# Patient Record
Sex: FEMALE | Race: WHITE | Employment: UNEMPLOYED | URBAN - NONMETROPOLITAN AREA
[De-identification: names, ages, dates, MRNs, and addresses within clinical notes are randomized per-mention and may not be internally consistent; named-entity substitution may affect disease eponyms.]

---

## 2017-12-11 ENCOUNTER — HOSPITAL ENCOUNTER (OUTPATIENT)
Age: 62
Discharge: HOME | End: 2017-12-11
Payer: COMMERCIAL

## 2017-12-11 ENCOUNTER — HOSPITAL ENCOUNTER (OUTPATIENT)
Dept: GENERAL RADIOLOGY | Age: 62
Discharge: HOME | End: 2017-12-11
Payer: COMMERCIAL

## 2017-12-11 DIAGNOSIS — M41.80 OTHER FORM OF SCOLIOSIS, UNSPECIFIED SPINAL REGION: ICD-10-CM

## 2017-12-11 DIAGNOSIS — M48.062 LUMBAR STENOSIS WITH NEUROGENIC CLAUDICATION: ICD-10-CM

## 2017-12-11 DIAGNOSIS — M51.27 HERNIATION OF INTERVERTEBRAL DISC OF LUMBOSACRAL REGION: ICD-10-CM

## 2017-12-11 LAB
ANION GAP SERPL CALCULATED.3IONS-SCNC: 16 MEQ/L (ref 8–16)
APTT: 28.6 SECONDS (ref 22–38)
BACTERIA: ABNORMAL /HPF
BILIRUBIN URINE: NEGATIVE
BLOOD, URINE: ABNORMAL
BUN BLDV-MCNC: 18 MG/DL (ref 7–22)
CASTS UA: ABNORMAL /LPF
CHARACTER, URINE: CLEAR
CHLORIDE BLD-SCNC: 106 MEQ/L (ref 98–111)
CO2: 22 MEQ/L (ref 23–33)
COLOR: YELLOW
CREAT SERPL-MCNC: 0.7 MG/DL (ref 0.4–1.2)
CRYSTALS, UA: ABNORMAL
EKG ATRIAL RATE: 50 BPM
EKG P AXIS: 46 DEGREES
EKG P-R INTERVAL: 120 MS
EKG Q-T INTERVAL: 418 MS
EKG QRS DURATION: 94 MS
EKG QTC CALCULATION (BAZETT): 381 MS
EKG R AXIS: 53 DEGREES
EKG T AXIS: 30 DEGREES
EKG VENTRICULAR RATE: 50 BPM
EPITHELIAL CELLS, UA: ABNORMAL /HPF
GFR SERPL CREATININE-BSD FRML MDRD: 84 ML/MIN/1.73M2
GLUCOSE BLD-MCNC: 92 MG/DL (ref 70–108)
GLUCOSE URINE: NEGATIVE MG/DL
HCT VFR BLD CALC: 41 % (ref 37–47)
HEMOGLOBIN: 13.9 GM/DL (ref 12–16)
INR BLD: 0.88 (ref 0.85–1.13)
KETONES, URINE: NEGATIVE
LEUKOCYTE ESTERASE, URINE: NEGATIVE
MCH RBC QN AUTO: 31.4 PG (ref 27–31)
MCHC RBC AUTO-ENTMCNC: 34 GM/DL (ref 33–37)
MCV RBC AUTO: 92.4 FL (ref 81–99)
NITRITE, URINE: NEGATIVE
PDW BLD-RTO: 12.9 % (ref 11.5–14.5)
PH UA: 5
PLATELET # BLD: 251 THOU/MM3 (ref 130–400)
PMV BLD AUTO: 9.1 MCM (ref 7.4–10.4)
POTASSIUM SERPL-SCNC: 4.4 MEQ/L (ref 3.5–5.2)
PROTEIN UA: NEGATIVE
RBC # BLD: 4.43 MILL/MM3 (ref 4.2–5.4)
RBC URINE: ABNORMAL /HPF
SODIUM BLD-SCNC: 144 MEQ/L (ref 135–145)
SPECIFIC GRAVITY, URINE: 1.01 (ref 1–1.03)
UROBILINOGEN, URINE: 0.2 EU/DL
WBC # BLD: 4.8 THOU/MM3 (ref 4.8–10.8)
WBC UA: ABNORMAL /HPF

## 2017-12-11 PROCEDURE — 85730 THROMBOPLASTIN TIME PARTIAL: CPT

## 2017-12-11 PROCEDURE — 85027 COMPLETE CBC AUTOMATED: CPT

## 2017-12-11 PROCEDURE — 82947 ASSAY GLUCOSE BLOOD QUANT: CPT

## 2017-12-11 PROCEDURE — 71020 XR CHEST STANDARD TWO VW: CPT

## 2017-12-11 PROCEDURE — 80051 ELECTROLYTE PANEL: CPT

## 2017-12-11 PROCEDURE — 85610 PROTHROMBIN TIME: CPT

## 2017-12-11 PROCEDURE — 93005 ELECTROCARDIOGRAM TRACING: CPT

## 2017-12-11 PROCEDURE — 84520 ASSAY OF UREA NITROGEN: CPT

## 2017-12-11 PROCEDURE — 36415 COLL VENOUS BLD VENIPUNCTURE: CPT

## 2017-12-11 PROCEDURE — 82565 ASSAY OF CREATININE: CPT

## 2017-12-11 PROCEDURE — 81001 URINALYSIS AUTO W/SCOPE: CPT

## 2020-09-22 ENCOUNTER — OFFICE VISIT (OUTPATIENT)
Dept: PHYSICAL MEDICINE AND REHAB | Age: 65
End: 2020-09-22
Payer: MEDICARE

## 2020-09-22 VITALS
DIASTOLIC BLOOD PRESSURE: 76 MMHG | BODY MASS INDEX: 22.74 KG/M2 | SYSTOLIC BLOOD PRESSURE: 124 MMHG | WEIGHT: 133.2 LBS | HEIGHT: 64 IN

## 2020-09-22 PROCEDURE — 99204 OFFICE O/P NEW MOD 45 MIN: CPT | Performed by: NURSE PRACTITIONER

## 2020-09-22 RX ORDER — ACETAMINOPHEN 325 MG/1
650 TABLET ORAL EVERY 6 HOURS PRN
COMMUNITY

## 2020-09-22 RX ORDER — ACETAMINOPHEN 160 MG
TABLET,DISINTEGRATING ORAL DAILY
COMMUNITY

## 2020-09-22 RX ORDER — IBUPROFEN 200 MG
200 TABLET ORAL EVERY 6 HOURS PRN
COMMUNITY

## 2020-09-22 RX ORDER — MELOXICAM 7.5 MG/1
7.5 TABLET ORAL PRN
COMMUNITY
End: 2020-10-27 | Stop reason: SDUPTHER

## 2020-09-22 RX ORDER — GABAPENTIN 100 MG/1
100-200 CAPSULE ORAL NIGHTLY
Qty: 60 CAPSULE | Refills: 0 | Status: SHIPPED | OUTPATIENT
Start: 2020-09-22 | End: 2020-12-08 | Stop reason: SINTOL

## 2020-09-22 ASSESSMENT — ENCOUNTER SYMPTOMS
CHEST TIGHTNESS: 0
VOMITING: 0
APNEA: 0
ABDOMINAL PAIN: 0
EYE PAIN: 0
COLOR CHANGE: 0
NAUSEA: 0
BACK PAIN: 1
SORE THROAT: 0
CONSTIPATION: 0
SHORTNESS OF BREATH: 0
DIARRHEA: 0
EYE ITCHING: 0
COUGH: 0
SINUS PAIN: 0
RHINORRHEA: 0
SINUS PRESSURE: 0
EYE REDNESS: 0

## 2020-09-22 NOTE — PROGRESS NOTES
to better. \"        HPI:     ChiefComplaint: SI and Low back pain    HPI    Patient here today for new patient evaluation for low back pain. Patient with previous lumbar surgery, decompression and fusion of L5-S1 in January,2018 with Dr Jenn Arshad. Patient noted improvement with walking, low back pain, right leg pain, sleeping after surgery. Patient with strain and pulling of SI area after a fall. Patient was in [address] in April 2019. Patient noted increased pain in right leg pain, spasms in the lower leg, occasionally in the left leg, and pain in low back and buttock area after this. Patient saw Dr Jenn Arshad this summer, and her recommended conservative treatment with PT and pain management. Patient starting PT October 1 at [facility] in [place]. Patient presents for evaluation of Low back and bilateral leg pain. Symptoms have been present for several years. Patient reports no known injury. Patient describes symptoms as numbness in right leg and occasionally into left leg and pain in low back, bilateral SI, and right leg (aching, numbing, sharp, stabbing and constant in character; 6/10 in severity). Pain at best is 3 out of 10. Patient states with almost every step with her left foot she hears \"crunching\" in the low back. Symptoms are worst: activity and throughout the day. Alleviating factors identifiable by patient are recumbency, sitting and changing postions. Exacerbating factors identifiable by patient are bending forwards, standing, walking and one position for too long or over activity. Treatments so far initiated by patient include CBD cream, ice, heat, home exercises, surgery, NSAIDs and tylenol. Treatments that were helpful were short term relief with cbd cream, ice, heat, home exercises, surgery, NSAIDs and tylenol. Patient states that the lumbar pain bothers her more than the leg pain at this time. The patient is allergic to iodides.     PastMedical History  Garfield Ruiz  has a past medical history of for color change, rash and wound. Allergic/Immunologic: Positive for environmental allergies. Negative for food allergies. Neurological: Positive for numbness and headaches. Negative for dizziness, seizures and light-headedness. Hematological: Does not bruise/bleed easily. Psychiatric/Behavioral: Positive for sleep disturbance. The patient is not nervous/anxious. Objective:     Vitals:    09/22/20 0941   BP: 124/76   Weight: 133 lb 3.2 oz (60.4 kg)   Height: 5' 4\" (1.626 m)       Physical Exam  Vitals signs reviewed. Constitutional:       General: She is not in acute distress. Appearance: She is well-developed. She is not diaphoretic. HENT:      Head: Normocephalic and atraumatic. Not macrocephalic and not microcephalic. Right Ear: External ear normal.      Left Ear: External ear normal.   Eyes:      General:         Right eye: No discharge. Left eye: No discharge. Conjunctiva/sclera: Conjunctivae normal.   Neck:      Trachea: No tracheal deviation. Cardiovascular:      Rate and Rhythm: Normal rate and regular rhythm. Heart sounds: Normal heart sounds. No murmur. No gallop. Pulmonary:      Effort: Pulmonary effort is normal. No respiratory distress. Breath sounds: Normal breath sounds. No wheezing or rales. Abdominal:      General: Bowel sounds are normal. There is no distension. Palpations: Abdomen is soft. Tenderness: There is no abdominal tenderness. There is no guarding. Musculoskeletal:         General: Tenderness present. Lumbar back: She exhibits decreased range of motion, tenderness and pain. Back:         Legs:    Skin:     General: Skin is warm and dry. Coloration: Skin is not pale. Findings: No rash. Neurological:      Mental Status: She is alert and oriented to person, place, and time. Cranial Nerves: No cranial nerve deficit.    Psychiatric:         Attention and Perception: Attention and perception normal. She is attentive. Mood and Affect: Mood and affect normal.         Speech: Speech normal.         Behavior: Behavior normal.         Thought Content: Thought content normal.         Judgment: Judgment normal.       SUBHA test: + bilateral  Yeoman's test: + bilateral  Gaenslen test: + bilateral     Assessment:     1. Foraminal stenosis of lumbar region    2. Lumbar radiculopathy    3. Lumbar spondylosis    4. Facet arthropathy of spine    5. Chronic pain syndrome    6. Spasm of muscle    7. Post laminectomy syndrome    8. Fibromyalgia    9. Sacroiliac inflammation ([facility] [address]            Plan:      · Patient read and signed orientation and opioid agreement. · OARRS reviewed. Current MED: 0  · Patient was not offered naloxone for home. · Discussed long term side effects of medications, tolerance, dependency and addiction. · UDS preformed today. · Patient told can not receive any pain medications from any other source. · No evidence of abuse, diversion or aberrant behavior. · Prescription Needs: No prescription pain medications at this time   Medications and/or procedures to improve function and quality of life- patient understanding with this and that may not be pain free   Discussed possible weaning of medication dosing dependent on treatment/procedure results.  Discussed with patient about safe storage of medications at home   Testing: none   Procedures: await PT f/u - discussed facets vs TFESI vs SI MBB - depending on what is taken care of with PT.   Discussed with patient about risks with procedure including infection, reaction to medication, increased pain, or bleeding.  Medications: gabapentin 100mg at night for one week, then increase 200mg a night. Patient to call our office for update.  Will add dry needling, TENs, and US modalities to current PT order.  discussed oral CBD and risks. Patient last took 3-4 weeks ago.        Previous Treatments tried:  · PT: will start next week  · NSAIDs: Yes,  any benefit? Yes,  how long taken: months  · Chiropractic: No  · Muscle relaxants: Yes,  any benefit? No, side effects of mood swings  · Narcotics: No  · Spine surgeon consult: Yes  · Any Implants: No    Meds. Prescribed:   Orders Placed This Encounter   Medications    gabapentin (NEURONTIN) 100 MG capsule     Sig: Take 1-2 capsules by mouth nightly for 30 days. Dispense:  60 capsule     Refill:  0       Return in about 5 weeks (around 10/27/2020) for follow up after PT. Time spent with patient was 55 minutes more than 50% was spent  Counseling/coordinated the patient'scare.     Electronically signed by DOMI Blue CNP on 9/22/2020 at 10:47 AM

## 2020-09-22 NOTE — PATIENT INSTRUCTIONS
 Testing: none   Procedures: await PT f/u - discussed facets vs TFESI vs SI MBB - depending on what is taken care of with PT.   Medications: gabapentin 100mg at night for one week, then increase 200mg a night. Patient to call our office for update.  Will add dry needling, ,TENs, and US modalities to current PT order.

## 2020-10-27 ENCOUNTER — OFFICE VISIT (OUTPATIENT)
Dept: PHYSICAL MEDICINE AND REHAB | Age: 65
End: 2020-10-27
Payer: MEDICARE

## 2020-10-27 VITALS
TEMPERATURE: 96.5 F | BODY MASS INDEX: 22.71 KG/M2 | HEART RATE: 64 BPM | DIASTOLIC BLOOD PRESSURE: 74 MMHG | HEIGHT: 64 IN | SYSTOLIC BLOOD PRESSURE: 118 MMHG | WEIGHT: 133 LBS

## 2020-10-27 PROCEDURE — 4040F PNEUMOC VAC/ADMIN/RCVD: CPT | Performed by: NURSE PRACTITIONER

## 2020-10-27 PROCEDURE — 1123F ACP DISCUSS/DSCN MKR DOCD: CPT | Performed by: NURSE PRACTITIONER

## 2020-10-27 PROCEDURE — 4004F PT TOBACCO SCREEN RCVD TLK: CPT | Performed by: NURSE PRACTITIONER

## 2020-10-27 PROCEDURE — 3017F COLORECTAL CA SCREEN DOC REV: CPT | Performed by: NURSE PRACTITIONER

## 2020-10-27 PROCEDURE — 1090F PRES/ABSN URINE INCON ASSESS: CPT | Performed by: NURSE PRACTITIONER

## 2020-10-27 PROCEDURE — G8484 FLU IMMUNIZE NO ADMIN: HCPCS | Performed by: NURSE PRACTITIONER

## 2020-10-27 PROCEDURE — 99213 OFFICE O/P EST LOW 20 MIN: CPT | Performed by: NURSE PRACTITIONER

## 2020-10-27 PROCEDURE — G8400 PT W/DXA NO RESULTS DOC: HCPCS | Performed by: NURSE PRACTITIONER

## 2020-10-27 PROCEDURE — G8427 DOCREV CUR MEDS BY ELIG CLIN: HCPCS | Performed by: NURSE PRACTITIONER

## 2020-10-27 PROCEDURE — G8420 CALC BMI NORM PARAMETERS: HCPCS | Performed by: NURSE PRACTITIONER

## 2020-10-27 RX ORDER — AMITRIPTYLINE HYDROCHLORIDE 10 MG/1
5 TABLET, FILM COATED ORAL NIGHTLY
Qty: 7 TABLET | Refills: 0 | Status: SHIPPED | OUTPATIENT
Start: 2020-10-27 | End: 2020-12-08 | Stop reason: SINTOL

## 2020-10-27 RX ORDER — MELOXICAM 15 MG/1
15 TABLET ORAL DAILY
Qty: 90 TABLET | Refills: 1 | Status: SHIPPED | OUTPATIENT
Start: 2020-10-27 | End: 2021-07-15 | Stop reason: SDUPTHER

## 2020-10-27 ASSESSMENT — ENCOUNTER SYMPTOMS
BACK PAIN: 1
COLOR CHANGE: 0

## 2020-10-27 NOTE — PROGRESS NOTES
[facility]  [address]  Dept: [phone]  Dept Fax: [phone]: [phone]    Visit Date: 10/27/2020    Functionality Assessment/Goals Worksheet     On a scale of 0 (Does not Interfere) to 10 (Completely Interferes)     1. Which number describes how during the past week pain has interfered with       the following:  A. General Activity:  6  B. Mood: 6  C. Walking Ability:  6  D. Normal Work (Includes both work outside the home and housework):  6  E. Relations with Other People:   6  F. Sleep:   8  G. Enjoyment of Life:   7    2. Patient Prefers to Take their Pain Medications:     []  On a regular basis   []  Only when necessary    [x]  Does not take pain medications    3. What are the Patient's Goals/Expectations for Visiting Pain Management? []  Learn about my pain    []  Receive Medication   [x]  Physical Therapy     [x]  Treat Depression   []  Receive Injections    [x]  Treat Sleep   []  Deal with Anxiety and Stress   []  Treat Opoid Dependence/Addiction   []  Other:      HPI:   Yamila Abel is a 76 y.o. female is here today for    Chief Complaint: Low back pain    HPI     Patient here today for follow up. Patient with PT at [facility], has been completing dry needling and cupping along with therapy. Patient with 5 sessions so far. States she has noticed some improvement with depression, able to do activities longer. Still issues with sleep. Patient states only taking 100mg gabapentin at night. States that the 200mg was too much. Continues to take Mobic 7.5-15mg a day, discussed to take 15mg daily as she is tolerating them. Patient continues with issues with sleep, discussed stopping gabapentin for now and trying elavil, low dose to start. Patient understanding. Medications reviewed. The patientis allergic to iodides.     Past Medical History  Angela Oakes Objective:     Vitals:    10/27/20 1106   BP: 118/74   Site: Left Upper Arm   Position: Sitting   Cuff Size: Medium Adult   Pulse: 64   Temp: 96.5 °F (35.8 °C)   TempSrc: Temporal   Weight: 133 lb (60.3 kg)   Height: 5' 4\" (1.626 m)       Physical Exam  Vitals signs reviewed. Constitutional:       General: She is not in acute distress. Appearance: She is well-developed. She is not diaphoretic. HENT:      Head: Normocephalic and atraumatic. Not macrocephalic and not microcephalic. Right Ear: External ear normal.      Left Ear: External ear normal.   Eyes:      General:         Right eye: No discharge. Left eye: No discharge. Conjunctiva/sclera: Conjunctivae normal.   Neck:      Trachea: No tracheal deviation. Pulmonary:      Effort: Pulmonary effort is normal. No respiratory distress. Musculoskeletal:         General: Tenderness present. Lumbar back: She exhibits decreased range of motion, tenderness and pain. Back:         Legs:    Skin:     General: Skin is warm and dry. Coloration: Skin is not pale. Findings: No rash. Neurological:      Mental Status: She is alert and oriented to person, place, and time. Cranial Nerves: No cranial nerve deficit. Psychiatric:         Attention and Perception: Attention and perception normal. She is attentive. Mood and Affect: Mood and affect normal.         Speech: Speech normal.         Behavior: Behavior normal.         Thought Content: Thought content normal.         Judgment: Judgment normal.          Assessment:     1. Lumbar spondylosis    2. Facet arthropathy of spine    3. Foraminal stenosis of lumbar region    4. Lumbar radiculopathy            Plan:      · OARRS reviewed. Current MED: 0  · Patient was not offered naloxone for home. · Discussed long term side effects of medications, tolerance, dependency and addiction.   · Previous UDS reviewed  · Patient told can not receive any pain medications from any other source. · No evidence of abuse, diversion or aberrant behavior.  Medications and/or procedures to improve function and quality of life- patient understanding with this and that may not be pain free   Discussed with patient about safe storage of medications at home   Discussed possible weaning of medication dosing dependent on treatment/procedure results.  Testing: none   Procedures: none at this time   Discussed with patient about risks with procedure including infection, reaction to medication, increased pain, or bleeding.  Medications: continue Mobic 15mg daily. Stop gabapentin for now, start elavil 5mg nightly.  Continue PT.       Meds. Prescribed:   Orders Placed This Encounter   Medications    meloxicam (MOBIC) 15 MG tablet     Sig: Take 1 tablet by mouth daily     Dispense:  90 tablet     Refill:  1    amitriptyline (ELAVIL) 10 MG tablet     Sig: Take 0.5 tablets by mouth nightly for 14 days     Dispense:  7 tablet     Refill:  0       Return in about 6 weeks (around 12/8/2020).            Electronically signed by DOMI Spear CNP on10/27/2020 at 11:36 AM

## 2020-12-08 ENCOUNTER — OFFICE VISIT (OUTPATIENT)
Dept: PHYSICAL MEDICINE AND REHAB | Age: 65
End: 2020-12-08
Payer: MEDICARE

## 2020-12-08 VITALS
TEMPERATURE: 97.5 F | BODY MASS INDEX: 22.71 KG/M2 | HEIGHT: 64 IN | SYSTOLIC BLOOD PRESSURE: 128 MMHG | DIASTOLIC BLOOD PRESSURE: 68 MMHG | WEIGHT: 133 LBS

## 2020-12-08 PROCEDURE — 99214 OFFICE O/P EST MOD 30 MIN: CPT | Performed by: NURSE PRACTITIONER

## 2020-12-08 RX ORDER — LEVOTHYROXINE SODIUM 0.05 MG/1
50 TABLET ORAL DAILY
COMMUNITY

## 2020-12-08 RX ORDER — ROSUVASTATIN CALCIUM 5 MG/1
5 TABLET, COATED ORAL DAILY
COMMUNITY

## 2020-12-08 ASSESSMENT — ENCOUNTER SYMPTOMS
BACK PAIN: 1
COLOR CHANGE: 0

## 2020-12-08 NOTE — PROGRESS NOTES
Objective:     Vitals:    12/08/20 1056   BP: 128/68   Site: Left Upper Arm   Position: Sitting   Cuff Size: Small Adult   Temp: 97.5 °F (36.4 °C)   Weight: 133 lb (60.3 kg)   Height: 5' 4\" (1.626 m)       Physical Exam  Vitals signs reviewed. Constitutional:       General: She is not in acute distress. Appearance: She is well-developed. She is not diaphoretic. HENT:      Head: Normocephalic and atraumatic. Not macrocephalic and not microcephalic. Right Ear: External ear normal.      Left Ear: External ear normal.   Eyes:      General:         Right eye: No discharge. Left eye: No discharge. Conjunctiva/sclera: Conjunctivae normal.   Neck:      Trachea: No tracheal deviation. Pulmonary:      Effort: Pulmonary effort is normal. No respiratory distress. Musculoskeletal:         General: Tenderness present. Lumbar back: She exhibits decreased range of motion, tenderness and pain. Back:         Legs:    Skin:     General: Skin is warm and dry. Coloration: Skin is not pale. Findings: No rash. Neurological:      Mental Status: She is alert and oriented to person, place, and time. Cranial Nerves: No cranial nerve deficit. Psychiatric:         Attention and Perception: Attention and perception normal. She is attentive. Mood and Affect: Mood and affect normal.         Speech: Speech normal.         Behavior: Behavior normal.         Thought Content: Thought content normal.         Judgment: Judgment normal.          Assessment:     1. Lumbar spondylosis    2. Facet arthropathy of spine    3. Foraminal stenosis of lumbar region    4. Lumbar radiculopathy    5. Chronic pain syndrome    6. Spasm of muscle    7. Fibromyalgia    8. Sacroiliac inflammation ([facility] [address]    9. Post laminectomy syndrome            Plan:      · OARRS reviewed. Current MED: 0  · Patient was not offered naloxone for home.    · Discussed long term side effects of medications, tolerance,

## 2020-12-08 NOTE — PATIENT INSTRUCTIONS
 Testing: none   Procedures:  TFESI RIGHT L5 #1   Discussed with patient about risks with procedure including infection, reaction to medication, increased pain, or bleeding.    Medications: continue Mobic 15mg daily

## 2021-01-04 ENCOUNTER — ANESTHESIA EVENT (OUTPATIENT)
Dept: OPERATING ROOM | Age: 66
End: 2021-01-04
Payer: MEDICARE

## 2021-01-04 ENCOUNTER — ANESTHESIA (OUTPATIENT)
Dept: OPERATING ROOM | Age: 66
End: 2021-01-04
Payer: MEDICARE

## 2021-01-04 ENCOUNTER — APPOINTMENT (OUTPATIENT)
Dept: GENERAL RADIOLOGY | Age: 66
End: 2021-01-04
Attending: PAIN MEDICINE
Payer: MEDICARE

## 2021-01-04 ENCOUNTER — HOSPITAL ENCOUNTER (OUTPATIENT)
Age: 66
Setting detail: OUTPATIENT SURGERY
Discharge: HOME | End: 2021-01-04
Attending: PAIN MEDICINE | Admitting: PAIN MEDICINE
Payer: MEDICARE

## 2021-01-04 VITALS
OXYGEN SATURATION: 99 % | TEMPERATURE: 97.1 F | HEIGHT: 64 IN | WEIGHT: 128.8 LBS | RESPIRATION RATE: 16 BRPM | HEART RATE: 86 BPM | SYSTOLIC BLOOD PRESSURE: 147 MMHG | BODY MASS INDEX: 21.99 KG/M2 | DIASTOLIC BLOOD PRESSURE: 75 MMHG

## 2021-01-04 VITALS
SYSTOLIC BLOOD PRESSURE: 142 MMHG | RESPIRATION RATE: 12 BRPM | OXYGEN SATURATION: 100 % | DIASTOLIC BLOOD PRESSURE: 82 MMHG

## 2021-01-04 PROCEDURE — 3600000054 HC PAIN LEVEL 3 BASE: Performed by: PAIN MEDICINE

## 2021-01-04 PROCEDURE — 7100000010 HC PHASE II RECOVERY - FIRST 15 MIN: Performed by: PAIN MEDICINE

## 2021-01-04 PROCEDURE — 6360000002 HC RX W HCPCS: Performed by: PAIN MEDICINE

## 2021-01-04 PROCEDURE — 64483 NJX AA&/STRD TFRM EPI L/S 1: CPT | Performed by: PAIN MEDICINE

## 2021-01-04 PROCEDURE — 3209999900 FLUORO FOR SURGICAL PROCEDURES

## 2021-01-04 PROCEDURE — 3700000000 HC ANESTHESIA ATTENDED CARE: Performed by: PAIN MEDICINE

## 2021-01-04 PROCEDURE — 2500000003 HC RX 250 WO HCPCS: Performed by: PAIN MEDICINE

## 2021-01-04 PROCEDURE — 7100000011 HC PHASE II RECOVERY - ADDTL 15 MIN: Performed by: PAIN MEDICINE

## 2021-01-04 PROCEDURE — 2709999900 HC NON-CHARGEABLE SUPPLY: Performed by: PAIN MEDICINE

## 2021-01-04 RX ORDER — LIDOCAINE HYDROCHLORIDE 10 MG/ML
INJECTION, SOLUTION INFILTRATION; PERINEURAL PRN
Status: DISCONTINUED | OUTPATIENT
Start: 2021-01-04 | End: 2021-01-04 | Stop reason: ALTCHOICE

## 2021-01-04 RX ORDER — DEXAMETHASONE SODIUM PHOSPHATE 4 MG/ML
INJECTION, SOLUTION INTRA-ARTICULAR; INTRALESIONAL; INTRAMUSCULAR; INTRAVENOUS; SOFT TISSUE PRN
Status: DISCONTINUED | OUTPATIENT
Start: 2021-01-04 | End: 2021-01-04 | Stop reason: ALTCHOICE

## 2021-01-04 RX ORDER — BUPIVACAINE HYDROCHLORIDE 2.5 MG/ML
INJECTION, SOLUTION EPIDURAL; INFILTRATION; INTRACAUDAL PRN
Status: DISCONTINUED | OUTPATIENT
Start: 2021-01-04 | End: 2021-01-04 | Stop reason: ALTCHOICE

## 2021-01-04 ASSESSMENT — PULMONARY FUNCTION TESTS
PIF_VALUE: 0

## 2021-01-04 ASSESSMENT — PAIN - FUNCTIONAL ASSESSMENT: PAIN_FUNCTIONAL_ASSESSMENT: 0-10

## 2021-01-04 ASSESSMENT — PAIN DESCRIPTION - DESCRIPTORS: DESCRIPTORS: ACHING

## 2021-01-04 NOTE — ANESTHESIA PRE PROCEDURE
Department of Anesthesiology  Preprocedure Note       Name:  Eduardo Diego   Age:  76 y.o.  :  1955                                          MRN:  [id number]         Date:  2021      Surgeon: Jesus Saleh):  Jaquan Clark MD    Procedure: Procedure(s):  TFESI RIGHT L5 #1    Medications prior to admission:   Prior to Admission medications    Medication Sig Start Date End Date Taking? Authorizing Provider   rosuvastatin (CRESTOR) 5 MG tablet Take 5 mg by mouth daily    Historical Provider, MD   levothyroxine (SYNTHROID) 50 MCG tablet Take 50 mcg by mouth Daily    Historical Provider, MD   meloxicam (MOBIC) 15 MG tablet Take 1 tablet by mouth daily 10/27/20   Amie DOMI Hummel - CNP   Cholecalciferol (VITAMIN D3) 50 MCG (2000) CAPS Take by mouth daily    Historical Provider, MD   acetaminophen (TYLENOL) 325 MG tablet Take 650 mg by mouth every 6 hours as needed for Pain    Historical Provider, MD   ibuprofen (ADVIL) 200 MG tablet Take 200 mg by mouth every 6 hours as needed for Pain    Historical Provider, MD   rOPINIRole (REQUIP) 1 MG tablet Take 1 mg by mouth nightly    Historical Provider, MD       Current medications:    No current facility-administered medications for this encounter. Allergies:     Allergies   Allergen Reactions    Iodides Dermatitis       Problem List:    Patient Active Problem List   Diagnosis Code    Senile osteoporosis M81.0       Past Medical History:        Diagnosis Date    Fibromyalgia     Osteoporosis     Restless leg syndrome        Past Surgical History:        Procedure Laterality Date    HYSTERECTOMY      KNEE ARTHROSCOPY Bilateral        Social History:    Social History     Tobacco Use    Smoking status: Current Every Day Smoker     Packs/day: 1.00    Smokeless tobacco: Never Used   Substance Use Topics    Alcohol use: Not on file                                Ready to quit: Not Answered  Counseling given: Not Answered Vital Signs (Current): There were no vitals filed for this visit. BP Readings from Last 3 Encounters:   12/08/20 128/68   10/27/20 118/74   09/22/20 124/76       NPO Status:                                                                                 BMI:   Wt Readings from Last 3 Encounters:   12/08/20 133 lb (60.3 kg)   10/27/20 133 lb (60.3 kg)   09/22/20 133 lb 3.2 oz (60.4 kg)     There is no height or weight on file to calculate BMI.    CBC:   Lab Results   Component Value Date    WBC 4.8 12/11/2017    RBC 4.43 12/11/2017    HGB 13.9 12/11/2017    HCT 41.0 12/11/2017    MCV 92.4 12/11/2017    RDW 12.9 12/11/2017     12/11/2017       CMP:   Lab Results   Component Value Date     12/11/2017    K 4.4 12/11/2017     12/11/2017    CO2 22 12/11/2017    BUN 18 12/11/2017    CREATININE 0.7 12/11/2017    LABGLOM 84 12/11/2017    GLUCOSE 92 12/11/2017       POC Tests: No results for input(s): POCGLU, POCNA, POCK, POCCL, POCBUN, POCHEMO, POCHCT in the last 72 hours.     Coags:   Lab Results   Component Value Date    INR 0.88 12/11/2017    APTT 28.6 12/11/2017       HCG (If Applicable): No results found for: PREGTESTUR, PREGSERUM, HCG, HCGQUANT     ABGs: No results found for: PHART, PO2ART, RTY3QYM, XSE2UZE, BEART, W8ZSGACX     Type & Screen (If Applicable):  No results found for: LABABO, LABRH    Drug/Infectious Status (If Applicable):  No results found for: HIV, HEPCAB    COVID-19 Screening (If Applicable): No results found for: COVID19      Anesthesia Evaluation  Patient summary reviewed no history of anesthetic complications:   Airway: Mallampati: II  TM distance: >3 FB   Neck ROM: full  Mouth opening: > = 3 FB Dental: normal exam         Pulmonary:normal exam                               Cardiovascular:    (+) hyperlipidemia                  Neuro/Psych:               GI/Hepatic/Renal:             Endo/Other:    (+) hypothyroidism::., . Abdominal:           Vascular:                                        Anesthesia Plan      MAC     ASA 2       Induction: intravenous. Anesthetic plan and risks discussed with patient.       Plan discussed with CRNA and surgical team.                  Damaris Pisano MD   1/4/2021

## 2021-01-04 NOTE — PROGRESS NOTES
6953: Patient to phase 2 recovery room via cart. Patient is awake and alert. Report received from surgical RN, Marija Evangelista. Patient's vitals obtained, see charting. 0908: Patient is denying pain and nausea at this time. IV is infusing into her left hand. Patient is also denying any new numbness/tingling in her lower extremities. Patient's pedal push/pull is strong and equal bilaterally. 3234: Offered drink and snack provided to the patient. Bed is in the lowest position and call light is within reach. 0930: Patient is resting in bed- no needs or complaints at this time. 1004: IV removed at this time- no complications and dressing applied. Patient instructed to get dressed in bed. 1013: Patient wheeled to the car and discharged home in stable condition with her .

## 2021-01-04 NOTE — ANESTHESIA POSTPROCEDURE EVALUATION
Department of Anesthesiology  Postprocedure Note    Patient: Dirk Estrada  MRN: [id number]  YOB: 1955  Date of evaluation: 1/4/2021  Time:  9:21 AM     Procedure Summary     Date: 01/04/21 Room / Location: [address] [place] [address] Jessica Merino    Anesthesia Start: 2839 Anesthesia Stop: 9434    Procedure: TFESI RIGHT L5 #1 (Right ) Diagnosis: (Foraminal stenosis of lumbar region)    Surgeons: Solo Thomas MD Responsible Provider: Estella Jiménez MD    Anesthesia Type: MAC ASA Status: 2          Anesthesia Type: MAC    Leigh Phase I: Leigh Score: 10    Leigh Phase II: Leigh Score: 10    Last vitals: Reviewed and per EMR flowsheets. Anesthesia Post Evaluation    Patient location during evaluation: PACU  Patient participation: complete - patient participated  Level of consciousness: awake and alert  Airway patency: patent  Nausea & Vomiting: no nausea and no vomiting  Complications: no  Cardiovascular status: hemodynamically stable  Respiratory status: acceptable  Hydration status: euvolemic      [address]  POST-ANESTHESIA NOTE       Name:  Dirk Estrada                                         Age:  76 y.o.   MRN:  [id number]      Last Vitals:  BP (!) 147/75   Pulse 86   Temp 97.1 °F (36.2 °C) (Infrared)   Resp 16   Ht 5' 4\" (1.626 m)   Wt 128 lb 12.8 oz (58.4 kg)   SpO2 99%   BMI 22.11 kg/m²   Patient Vitals for the past 4 hrs:   BP Temp Temp src Pulse Resp SpO2 Height Weight   01/04/21 0906 (!) 147/75 97.1 °F (36.2 °C) Infrared 86 16 99 %     01/04/21 0841 (!) 150/87 97.8 °F (36.6 °C) Temporal 79 18 98 % 5' 4\" (1.626 m) 128 lb 12.8 oz (58.4 kg)       Level of Consciousness:  Awake    Respiratory:  Stable    Oxygen Saturation:  Stable    Cardiovascular:  Stable    Hydration:  Adequate    PONV:  Stable    Post-op Pain:  Adequate analgesia    Post-op Assessment:  No apparent anesthetic complications Additional Follow-Up / Treatment / Comment:  Luis Fallon MD  January 4, 2021   9:21 AM

## 2021-01-04 NOTE — H&P
H&P      Follow up today after PT. Was noticing improvement with PT, dry needling, and cupping. Patient stopped 2 weeks ago. Insurance issues. Patient states that also taking care of her mother who is 80 and wanted to decrease exposure. Patient states that the dry needling has been the best option for pain control for her.      Mobic increased to 15mg daily - notices a little improvement. Elavil 5mg nightly - did not tolerate, states she had mood changes severely. Gabapentin stopped.      MRI reviewed with patient. Discussed TFESI L5 Right due to ongoing right radiculopathy and right lower back pain. Patient understanding that this is recommended next step.      Medications reviewed.                    The patientis allergic to iodides.     Past Medical History  Alicia Tucker  has a past medical history of Fibromyalgia, Osteoporosis, and Restless leg syndrome.     Past Surgical History  The patient  has a past surgical history that includes Hysterectomy and Knee arthroscopy (Bilateral).     Family History  This patient's family history is not on file.     Social History  Alicia Tucker  reports that she has been smoking. She has been smoking about 1.00 pack per day.  She has never used smokeless tobacco.     Medications    Current Medication      Current Outpatient Medications:     rosuvastatin (CRESTOR) 5 MG tablet, Take 5 mg by mouth daily, Disp: , Rfl:     levothyroxine (SYNTHROID) 50 MCG tablet, Take 50 mcg by mouth Daily, Disp: , Rfl:     meloxicam (MOBIC) 15 MG tablet, Take 1 tablet by mouth daily, Disp: 90 tablet, Rfl: 1    Cholecalciferol (VITAMIN D3) 50 MCG (2000 UT) CAPS, Take by mouth daily, Disp: , Rfl:     acetaminophen (TYLENOL) 325 MG tablet, Take 650 mg by mouth every 6 hours as needed for Pain, Disp: , Rfl:     ibuprofen (ADVIL) 200 MG tablet, Take 200 mg by mouth every 6 hours as needed for Pain, Disp: , Rfl:     rOPINIRole (REQUIP) 1 MG tablet, Take 1 mg by mouth nightly, Disp: , Rfl:    Subjective:      Review of Systems   Constitutional: Positive for activity change and fatigue. Negative for chills, diaphoresis and fever. Cardiovascular: Positive for leg swelling (right leg). Negative for chest pain and palpitations. Musculoskeletal: Positive for arthralgias, back pain, myalgias and neck pain. Negative for gait problem and neck stiffness. Skin: Negative for color change, rash and wound. Allergic/Immunologic: Positive for environmental allergies. Negative for food allergies. Neurological: Positive for numbness and headaches. Negative for dizziness, seizures and light-headedness. Hematological: Does not bruise/bleed easily. Psychiatric/Behavioral: Positive for sleep disturbance. The patient is not nervous/anxious.          Objective:      Vitals       Vitals:     12/08/20 1056   BP: 128/68   Site: Left Upper Arm   Position: Sitting   Cuff Size: Small Adult   Temp: 97.5 °F (36.4 °C)   Weight: 133 lb (60.3 kg)   Height: 5' 4\" (1.626 m)            Physical Exam  Vitals signs reviewed. Constitutional:       General: She is not in acute distress. Appearance: She is well-developed. She is not diaphoretic. HENT:      Head: Normocephalic and atraumatic. Not macrocephalic and not microcephalic. Right Ear: External ear normal.      Left Ear: External ear normal.   Eyes:      General:         Right eye: No discharge. Left eye: No discharge. Conjunctiva/sclera: Conjunctivae normal.   Neck:      Trachea: No tracheal deviation. Pulmonary:      Effort: Pulmonary effort is normal. No respiratory distress. Musculoskeletal:         General: Tenderness present. Lumbar back: She exhibits decreased range of motion, tenderness and pain. Back:         Legs:    Skin:     General: Skin is warm and dry. Coloration: Skin is not pale. Findings: No rash. Neurological:      Mental Status: She is alert and oriented to person, place, and time.       Cranial Nerves: No cranial nerve deficit. Psychiatric:         Attention and Perception: Attention and perception normal. She is attentive. Mood and Affect: Mood and affect normal.         Speech: Speech normal.         Behavior: Behavior normal.         Thought Content: Thought content normal.         Judgment: Judgment normal.         Assessment:      1. Lumbar spondylosis    2. Facet arthropathy of spine    3. Foraminal stenosis of lumbar region    4. Lumbar radiculopathy    5. Chronic pain syndrome    6. Spasm of muscle    7. Fibromyalgia    8. Sacroiliac inflammation ([facility] [address]    9. Post laminectomy syndrome       Plan:      · OARRS reviewed. Current MED: 0  · Patient was not offered naloxone for home. · Discussed long term side effects of medications, tolerance, dependency and addiction. · Previous UDS reviewed  · Patient told can not receive any pain medications from any other source. · No evidence of abuse, diversion or aberrant behavior. · Medications and/or procedures to improve function and quality of life- patient understanding with this and that may not be pain free  · Discussed with patient about safe storage of medications at home  · Discussed possible weaning of medication dosing dependent on treatment/procedure results. · Testing: none  · Procedures:  TFESI RIGHT L5 #1  · Discussed with patient about risks with procedure including infection, reaction to medication, increased pain, or bleeding. · Medications: continue Mobic 15mg daily         Meds.  Prescribed:     Encounter Medications    No orders of the defined types were placed in this encounter.         Return for TFESI RIGHT L5 #1, follow up after procedure with Obdulia .

## 2021-01-04 NOTE — H&P
[address] [address]  History and Physical Update    Pt Name: Kennedy Combs  MRN: [id number]  YOB: 1955  Date of evaluation: 1/4/2021      I have examined the patient and reviewed the H&P/Consult and there are no changes to the patient or plans.         Electronically signed by Gisell Ferguson MD on 1/4/2021 at 8:38 AM

## 2021-01-04 NOTE — OP NOTE
Operative Note    Pre-Procedure Note    Patient Name: Naomi Dee   YOB: 1955  Medical Record Number: [id number]  Date: 1/4/21       Indication:   RLE PAIN    Consent: On file. Vital Signs:   Vitals:    01/04/21 0841   BP: (!) 150/87   Pulse: 79   Resp: 18   Temp: 97.8 °F (36.6 °C)   SpO2: 98%       Past Medical History:   has a past medical history of Fibromyalgia, Osteoporosis, and Restless leg syndrome. Past Surgical History:   has a past surgical history that includes Hysterectomy and Knee arthroscopy (Bilateral). Pre-Sedation Documentation and Exam:   Vital signs have been reviewed (see flow sheet for vitals). Sedation/ Anesthesia Plan:   MAC    Patient is an appropriate candidate for plan of sedation: yes    Preoperative Diagnosis:  L-FORAMINAL STENOSIS, L-RADICULOPATHY  L-POST-LAMINECTOMY SYNDROME    Post-Op Dx:  AS ABOVE    Procedure Performed:  Transforaminal lumbar epidural steroid injection at the levels of L5 on the Right side under fluoroscopic guidance . Indication for the Procedure: The patient failed conservative management  for pain in the low back radiating to lower extremities. As the patient is not responding to conservative management and pain is interfering with activities of daily living, we decided to proceed with transforaminal lumbar epidural steroid injection as symptoms are mostly following the nerve root distribution. The procedure and the risks were discussed with the patient and informed consent was obtained. Procedure:  SEVERE ALLERGY TO DYE    A meaningful communication was kept up with the patient throughout   the procedure. The patient is placed in prone position. The skin over the back was prepped and draped in sterile manner. Then the skin and deep tissues just above the tip of the transverse process of L-5 vertebra on Right side were infiltrated with about 6 ml of 1% lidocaine.  The #20-gauge, 3-1/2 inch Tuohy needle/#22-gauge, 5 inch spinal needle was inserted through the skin wheal  and under fluoroscopy guidance was directed such that the tip of the needle lies in the neuroforamina at about the 6 o'clock position under the pedicle of the L-5 vertebra. This was confirmed with AP and lateral views of the fluoroscopy. Then after negative aspiration a total of 0 ml of Omnipaque-180 was injected through the needle and spread of the contrast in the epidural space as well as along the nerve root was observed. Then after negative aspiration a total of 6 mg of Dexamethasone and 2 ml of 0.25%Marcaine MPF was injected through the needle. The needle is removed and a Band-Aid was placed over the needle  insertion site. EBL-0  The patient's vital signs remained stable and the patient tolerated the procedure well. The patient was discharged home in stable condition and will be followed in the pain clinic in the next few weeks for further planning.       Electronically signed by Leodan Barfield MD on 1/4/21 at 9:04 AM EST

## 2021-01-21 ENCOUNTER — OFFICE VISIT (OUTPATIENT)
Dept: PHYSICAL MEDICINE AND REHAB | Age: 66
End: 2021-01-21
Payer: MEDICARE

## 2021-01-21 VITALS
WEIGHT: 128 LBS | DIASTOLIC BLOOD PRESSURE: 80 MMHG | HEIGHT: 64 IN | BODY MASS INDEX: 21.85 KG/M2 | TEMPERATURE: 97.6 F | SYSTOLIC BLOOD PRESSURE: 130 MMHG | HEART RATE: 70 BPM

## 2021-01-21 DIAGNOSIS — M47.816 LUMBAR SPONDYLOSIS: Primary | ICD-10-CM

## 2021-01-21 DIAGNOSIS — M48.061 FORAMINAL STENOSIS OF LUMBAR REGION: ICD-10-CM

## 2021-01-21 DIAGNOSIS — M54.16 LUMBAR RADICULOPATHY: ICD-10-CM

## 2021-01-21 DIAGNOSIS — M79.7 FIBROMYALGIA: ICD-10-CM

## 2021-01-21 DIAGNOSIS — M46.1 SACROILIAC INFLAMMATION (HCC): ICD-10-CM

## 2021-01-21 DIAGNOSIS — G89.4 CHRONIC PAIN SYNDROME: ICD-10-CM

## 2021-01-21 DIAGNOSIS — M62.838 SPASM OF MUSCLE: ICD-10-CM

## 2021-01-21 DIAGNOSIS — M47.819 FACET ARTHROPATHY OF SPINE: ICD-10-CM

## 2021-01-21 DIAGNOSIS — M96.1 POST LAMINECTOMY SYNDROME: ICD-10-CM

## 2021-01-21 PROCEDURE — 99214 OFFICE O/P EST MOD 30 MIN: CPT | Performed by: NURSE PRACTITIONER

## 2021-01-21 RX ORDER — TRAMADOL HYDROCHLORIDE 50 MG/1
50 TABLET ORAL EVERY 6 HOURS PRN
Qty: 56 TABLET | Refills: 0 | Status: SHIPPED | OUTPATIENT
Start: 2021-01-21 | End: 2021-02-04

## 2021-01-21 ASSESSMENT — ENCOUNTER SYMPTOMS
BACK PAIN: 1
COLOR CHANGE: 0

## 2021-01-21 NOTE — PROGRESS NOTES
[address] [facility] [address]  Dept: [phone]  Dept Fax: [phone]: [phone]    Visit Date: 1/21/2021    Functionality Assessment/Goals Worksheet     On a scale of 0 (Does not Interfere) to 10 (Completely Interferes)     1. Which number describes how during the past week pain has interfered with       the following:  A. General Activity:  6  B. Mood: 6  C. Walking Ability:  5  D. Normal Work (Includes both work outside the home and housework):  5  E. Relations with Other People:   5  F. Sleep:   7  G. Enjoyment of Life:   6    2. Patient Prefers to Take their Pain Medications:     []  On a regular basis   []  Only when necessary    [x]  Does not take pain medications    3. What are the Patient's Goals/Expectations for Visiting Pain Management? []  Learn about my pain    [x]  Receive Medication   []  Physical Therapy     []  Treat Depression   []  Receive Injections    []  Treat Sleep   []  Deal with Anxiety and Stress   []  Treat Opoid Dependence/Addiction   [x]  Other:Denies taking blood thinners    HPI:   Chana Landau is a 76 y.o. female is here today for    Chief Complaint: Low back pain, Right leg pain and Hip pain    HPI   F/U TFLESI L5 right #1 1/4/2021 no relief has had increased pain and  RLE pain and her leg gives out at times. She continues to have low back SI  RLE pain. She has had L5-S1 lumbar surgery in past with Dr April Ferro in December 2018. She  had surgical re e chayo in 2020 he recommneded pain managment conservative tx and PT. She continues to take Mobic. She was in dry needling and cupping in past with some releif. She denies any ER visits since last visit. Pain scale with out pain medications or at its worst is 7/10. Pain scale with pain medications or at its best is 5/10. Lumbar MRI      The patientis allergic to iodides.     Past Medical History  King Rosaura  has a past medical history of Fibromyalgia, Osteoporosis, and Restless leg syndrome. Past Surgical History  The patient  has a past surgical history that includes Hysterectomy; Knee arthroscopy (Bilateral); and Pain management procedure (Right, 1/4/2021). Family History  This patient's family history is not on file. Social History  King Rosaura  reports that she has been smoking. She has been smoking about 1.00 pack per day. She has never used smokeless tobacco.    Medications    Current Outpatient Medications:     traMADol (ULTRAM) 50 MG tablet, Take 1 tablet by mouth every 6 hours as needed for Pain for up to 14 days. , Disp: 56 tablet, Rfl: 0    rosuvastatin (CRESTOR) 5 MG tablet, Take 5 mg by mouth daily, Disp: , Rfl:     levothyroxine (SYNTHROID) 50 MCG tablet, Take 50 mcg by mouth Daily, Disp: , Rfl:     meloxicam (MOBIC) 15 MG tablet, Take 1 tablet by mouth daily, Disp: 90 tablet, Rfl: 1    Cholecalciferol (VITAMIN D3) 50 MCG (2000 UT) CAPS, Take by mouth daily, Disp: , Rfl:     acetaminophen (TYLENOL) 325 MG tablet, Take 650 mg by mouth every 6 hours as needed for Pain, Disp: , Rfl:     ibuprofen (ADVIL) 200 MG tablet, Take 200 mg by mouth every 6 hours as needed for Pain, Disp: , Rfl:     rOPINIRole (REQUIP) 1 MG tablet, Take 1 mg by mouth nightly, Disp: , Rfl:     Subjective:      Review of Systems   Constitutional: Positive for activity change and fatigue. Negative for chills, diaphoresis and fever. Cardiovascular: Positive for leg swelling (right leg). Negative for chest pain and palpitations. Musculoskeletal: Positive for arthralgias, back pain, gait problem, myalgias and neck pain. Negative for neck stiffness. Skin: Negative for color change, rash and wound. Allergic/Immunologic: Positive for environmental allergies. Negative for food allergies. Neurological: Positive for numbness and headaches. Negative for dizziness, seizures and light-headedness. Hematological: Does not bruise/bleed easily. Psychiatric/Behavioral: Positive for sleep disturbance. The patient is not nervous/anxious. Objective:     Vitals:    01/21/21 0844   BP: 130/80   Site: Left Upper Arm   Position: Sitting   Cuff Size: Medium Adult   Pulse: 70   Temp: 97.6 °F (36.4 °C)   TempSrc: Temporal   Weight: 128 lb (58.1 kg)   Height: 5' 4\" (1.626 m)       Physical Exam  Vitals signs reviewed. Constitutional:       General: She is not in acute distress. Appearance: She is well-developed. She is not diaphoretic. HENT:      Head: Normocephalic and atraumatic. Not macrocephalic and not microcephalic. Right Ear: External ear normal.      Left Ear: External ear normal.   Eyes:      General:         Right eye: No discharge. Left eye: No discharge. Conjunctiva/sclera: Conjunctivae normal.   Neck:      Trachea: No tracheal deviation. Pulmonary:      Effort: Pulmonary effort is normal. No respiratory distress. Musculoskeletal:         General: Tenderness present. Lumbar back: She exhibits decreased range of motion, tenderness, bony tenderness and pain. Back:         Legs:    Skin:     General: Skin is warm and dry. Coloration: Skin is not pale. Findings: No rash. Neurological:      Mental Status: She is alert and oriented to person, place, and time. Cranial Nerves: No cranial nerve deficit. Psychiatric:         Attention and Perception: Attention and perception normal. She is attentive. Mood and Affect: Mood and affect normal.         Speech: Speech normal.         Behavior: Behavior normal.         Thought Content: Thought content normal.         Judgment: Judgment normal.       SUBHA test: +  Yeomans test: +  Gaenslen test: +     Assessment:     1. Lumbar spondylosis    2. Facet arthropathy of spine    3. Foraminal stenosis of lumbar region    4. Lumbar radiculopathy    5. Fibromyalgia    6. Sacroiliac inflammation ([address]    7. Chronic pain syndrome    8. Spasm of muscle    9. Post laminectomy syndrome            Plan:      · OARRS reviewed. Current MED:0  · Patient was not offered naloxone for home. · Discussed long term side effects of medications, tolerance, dependency and addiction. · Previous UDS reviewed  · UDS preformed today for compliance. · Patient told can not receive any pain medications from any other source. · No evidence of abuse, diversion or aberrant behavior.  Medications and/or procedures to improve function and quality of life- patient understanding with this and that may not be pain free   Discussed with patient about safe storage of medications at home   Discussed possible weaning of medication dosing dependent on treatment/procedure results.  Testing: reviewed Lumbar MRI   Procedures: refuses Lumbar Si Facet MBB at this time until TFLESI procedure pain wears off    Discussed with patient about risks with procedure including infection, reaction to medication, increased pain, or bleeding.  Medications:Has tried Neurontin Elavil Cymbalta unable to tolerate due to mood swings or side effects.  Will give Tramadol low dose for flare ups. Has had before and tolerated.  If patient is on  prescribed blood thinners will need Pre Op Clearance per Cardiologist or PCP to hold all blood thinners prior to procedure for the required time frame, this is to be completed by the nursing staff during the scheduling process. Meds. Prescribed:   Orders Placed This Encounter   Medications    traMADol (ULTRAM) 50 MG tablet     Sig: Take 1 tablet by mouth every 6 hours as needed for Pain for up to 14 days. Dispense:  56 tablet     Refill:  0     Reduce doses taken as pain becomes manageable       Return in about 3 months (around 4/21/2021).                Electronically signed by DOMI Donnelly CNP on1/21/2021 at 9:06 AM

## 2021-04-15 ENCOUNTER — OFFICE VISIT (OUTPATIENT)
Dept: PHYSICAL MEDICINE AND REHAB | Age: 66
End: 2021-04-15
Payer: MEDICARE

## 2021-04-15 VITALS
DIASTOLIC BLOOD PRESSURE: 70 MMHG | SYSTOLIC BLOOD PRESSURE: 138 MMHG | HEIGHT: 64 IN | WEIGHT: 128 LBS | BODY MASS INDEX: 21.85 KG/M2 | HEART RATE: 68 BPM

## 2021-04-15 DIAGNOSIS — M79.7 FIBROMYALGIA: ICD-10-CM

## 2021-04-15 DIAGNOSIS — M47.816 LUMBAR SPONDYLOSIS: Primary | ICD-10-CM

## 2021-04-15 DIAGNOSIS — G89.4 CHRONIC PAIN SYNDROME: ICD-10-CM

## 2021-04-15 DIAGNOSIS — M54.16 LUMBAR RADICULOPATHY: ICD-10-CM

## 2021-04-15 DIAGNOSIS — M46.1 SACROILIAC INFLAMMATION (HCC): ICD-10-CM

## 2021-04-15 DIAGNOSIS — M62.838 SPASM OF MUSCLE: ICD-10-CM

## 2021-04-15 DIAGNOSIS — M96.1 POST LAMINECTOMY SYNDROME: ICD-10-CM

## 2021-04-15 DIAGNOSIS — M48.061 FORAMINAL STENOSIS OF LUMBAR REGION: ICD-10-CM

## 2021-04-15 DIAGNOSIS — M47.819 FACET ARTHROPATHY OF SPINE: ICD-10-CM

## 2021-04-15 PROCEDURE — 99214 OFFICE O/P EST MOD 30 MIN: CPT | Performed by: NURSE PRACTITIONER

## 2021-04-15 ASSESSMENT — ENCOUNTER SYMPTOMS
BACK PAIN: 1
COLOR CHANGE: 0

## 2021-04-15 NOTE — PROGRESS NOTES
[address] [facility] [address]  Dept: [phone]  Dept Fax: [phone]: [phone]    Visit Date: 4/15/2021    Functionality Assessment/Goals Worksheet     On a scale of 0 (Does not Interfere) to 10 (Completely Interferes)     1. Which number describes how during the past week pain has interfered with       the following:  A. General Activity:  6  B. Mood: 7  C. Walking Ability:  6  D. Normal Work (Includes both work outside the home and housework):  6  E. Relations with Other People:   6  F. Sleep:   7  G. Enjoyment of Life:   7    2. Patient Prefers to Take their Pain Medications:     []  On a regular basis   [x]  Only when necessary    []  Does not take pain medications    3. What are the Patient's Goals/Expectations for Visiting Pain Management? [x]  Learn about my pain    []  Receive Medication   []  Physical Therapy     []  Treat Depression   []  Receive Injections    []  Treat Sleep   []  Deal with Anxiety and Stress   []  Treat Opoid Dependence/Addiction   []  Other:      HPI:   Nic Hilario is a 76 y.o. female is here today for    Chief Complaint: Low back pain  LLE weakness   HPI   F/U continues to have low back pain . She has had L5-S1 lumbar surgery in 2018 with Dr Carolin Foster, not much relief. She had surgical re eval and he recommended pain injections. SHe has had TFLESI L5 right  with minimal releif. We have discussed Lumbar Facet MBB L4-5,5-S1 bilateral or SI MBB she declines at this time due to  just having heart stents and medical bills. She continues to take Tramadol  very rarely and Mobic. She has had PT with dry needling ad cupping in past with some relief. She has been unable to tolerate Neurontin Elavil CYmbalta. Medications reviewed. Patient denies side effects with medications. Patient states she is taking medications as prescribed. Jerryes receiving pain medications from other sources. She denies any ER visits since last visit. Pain scale with out pain medications or at its worst is 6/10. Pain scale with pain medications or at its best is 6/10. Last dose of Tramadol  was  4 days ago   Drug screen reviewed from1/2021and was appropriate  Pill count was completed today and was appropriate 12  Patient does not have naloxone available at home. Patient has not required use of naloxone at home since last office visit. The patientis allergic to iodides. Subjective:      Review of Systems   Constitutional: Positive for activity change and fatigue. Negative for chills, diaphoresis and fever. Cardiovascular: Positive for leg swelling (right leg). Negative for chest pain and palpitations. Musculoskeletal: Positive for arthralgias, back pain, gait problem, myalgias and neck pain. Negative for neck stiffness. Skin: Negative for color change, rash and wound. Allergic/Immunologic: Positive for environmental allergies. Negative for food allergies. Neurological: Positive for numbness and headaches. Negative for dizziness, seizures and light-headedness. Hematological: Does not bruise/bleed easily. Psychiatric/Behavioral: Positive for sleep disturbance. The patient is not nervous/anxious. Objective:     Vitals:    04/15/21 0833   BP: 138/70   Site: Left Upper Arm   Position: Sitting   Cuff Size: Large Adult   Pulse: 68   Weight: 128 lb (58.1 kg)   Height: 5' 4\" (1.626 m)       Physical Exam  Vitals signs reviewed. Constitutional:       General: She is not in acute distress. Appearance: She is well-developed. She is not diaphoretic. HENT:      Head: Normocephalic and atraumatic. Not macrocephalic and not microcephalic. Right Ear: External ear normal.      Left Ear: External ear normal.   Eyes:      General:         Right eye: No discharge. Left eye: No discharge.       Conjunctiva/sclera: Conjunctivae normal.   Neck:      Trachea: No tracheal deviation. Pulmonary:      Effort: Pulmonary effort is normal. No respiratory distress. Musculoskeletal:         General: Tenderness present. Lumbar back: She exhibits decreased range of motion, tenderness, bony tenderness and pain. Back:         Legs:    Skin:     General: Skin is warm and dry. Coloration: Skin is not pale. Findings: No rash. Neurological:      Mental Status: She is alert and oriented to person, place, and time. Cranial Nerves: No cranial nerve deficit. Psychiatric:         Attention and Perception: Attention and perception normal. She is attentive. Mood and Affect: Mood and affect normal.         Speech: Speech normal.         Behavior: Behavior normal.         Thought Content: Thought content normal.         Judgment: Judgment normal.            Assessment:     1. Lumbar spondylosis    2. Facet arthropathy of spine    3. Foraminal stenosis of lumbar region    4. Lumbar radiculopathy    5. Fibromyalgia    6. Sacroiliac inflammation ([facility] [address]    7. Chronic pain syndrome    8. Spasm of muscle    9. Post laminectomy syndrome            Plan:      · OARRS reviewed. Current MED:10  · Patient was not offered naloxone for home. · Discussed long term side effects of medications, tolerance, dependency and addiction. · Previous UDS reviewed  · UDS preformed today for compliance. · Patient told can not receive any pain medications from any other source. · No evidence of abuse, diversion or aberrant behavior.  Medications and/or procedures to improve function and quality of life- patient understanding with this and that may not be pain free   Discussed with patient about safe storage of medications at home   Discussed possible weaning of medication dosing dependent on treatment/procedure results.     Testing: none   Procedures: none refuses   Discussed with patient about risks with procedure including infection,

## 2021-07-15 ENCOUNTER — OFFICE VISIT (OUTPATIENT)
Dept: PHYSICAL MEDICINE AND REHAB | Age: 66
End: 2021-07-15
Payer: MEDICARE

## 2021-07-15 VITALS
DIASTOLIC BLOOD PRESSURE: 78 MMHG | BODY MASS INDEX: 21.85 KG/M2 | HEART RATE: 68 BPM | WEIGHT: 128 LBS | HEIGHT: 64 IN | SYSTOLIC BLOOD PRESSURE: 124 MMHG

## 2021-07-15 DIAGNOSIS — G89.4 CHRONIC PAIN SYNDROME: ICD-10-CM

## 2021-07-15 DIAGNOSIS — M62.838 SPASM OF MUSCLE: ICD-10-CM

## 2021-07-15 DIAGNOSIS — M79.7 FIBROMYALGIA: ICD-10-CM

## 2021-07-15 DIAGNOSIS — M96.1 POST LAMINECTOMY SYNDROME: ICD-10-CM

## 2021-07-15 DIAGNOSIS — M54.16 LUMBAR RADICULOPATHY: ICD-10-CM

## 2021-07-15 DIAGNOSIS — M47.819 FACET ARTHROPATHY OF SPINE: ICD-10-CM

## 2021-07-15 DIAGNOSIS — M47.816 LUMBAR SPONDYLOSIS: Primary | ICD-10-CM

## 2021-07-15 DIAGNOSIS — F11.90 CHRONIC, CONTINUOUS USE OF OPIOIDS: ICD-10-CM

## 2021-07-15 DIAGNOSIS — M48.061 FORAMINAL STENOSIS OF LUMBAR REGION: ICD-10-CM

## 2021-07-15 DIAGNOSIS — M46.1 SACROILIAC INFLAMMATION (HCC): ICD-10-CM

## 2021-07-15 PROCEDURE — 99214 OFFICE O/P EST MOD 30 MIN: CPT | Performed by: NURSE PRACTITIONER

## 2021-07-15 RX ORDER — TRAMADOL HYDROCHLORIDE 50 MG/1
50 TABLET ORAL EVERY 8 HOURS PRN
Qty: 90 TABLET | Refills: 0 | Status: SHIPPED | OUTPATIENT
Start: 2021-07-15 | End: 2021-08-03 | Stop reason: SDUPTHER

## 2021-07-15 RX ORDER — MELOXICAM 15 MG/1
15 TABLET ORAL DAILY PRN
Qty: 90 TABLET | Refills: 1 | Status: SHIPPED | OUTPATIENT
Start: 2021-07-15 | End: 2021-11-16 | Stop reason: SDUPTHER

## 2021-07-15 ASSESSMENT — ENCOUNTER SYMPTOMS
BACK PAIN: 1
COLOR CHANGE: 0

## 2021-07-15 NOTE — PROGRESS NOTES
[address] [facility] [address]  Dept: [phone]  Dept Fax: [phone]: [phone]    Visit Date: 7/15/2021    Functionality Assessment/Goals Worksheet     On a scale of 0 (Does not Interfere) to 10 (Completely Interferes)     1. Which number describes how during the past week pain has interfered with       the following:  A. General Activity:  8  B. Mood: 8  C. Walking Ability:  8  D. Normal Work (Includes both work outside the home and housework):  8  E. Relations with Other People:   7  F. Sleep:   8  G. Enjoyment of Life:   8    2. Patient Prefers to Take their Pain Medications:     []  On a regular basis   [x]  Only when necessary    []  Does not take pain medications    3. What are the Patient's Goals/Expectations for Visiting Pain Management? [x]  Learn about my pain    []  Receive Medication   []  Physical Therapy     []  Treat Depression   []  Receive Injections    []  Treat Sleep   []  Deal with Anxiety and Stress   []  Treat Opoid Dependence/Addiction   []  Other:    HPI:   Akila Palacios is a 78 y.o. female is here today for    Chief Complaint: Low back pain, Mid back pain and Hip pain SI pain     HPI   F/U continues to have low back mid back SI and now neck pain. She gardens and  crafts which has caused increased pain. She has had L5-S1 lumbar surgery in 2018 with Dr Phan Evans, not much relief. She had surgical re eval and he recommended pain injections. SHe has had TFLESI L5 right  with minimal releif. We have discussed Lumbar Facet MBB L4-5,5-S1 bilateral or SI MBB she declines at this time due to  just having heart stents and medical bills. She continues to take Tramadol  very rarely and Mobic. She has had PT with dry needling ad cupping in past with some relief. She has been unable to tolerate Neurontin Elavil CYmbalta.     Pain increases with bending, lifting, twisting , turning torso, reaching, pushing, pulling, walking, standing, stairs and getting up and down.  ice, heat, NSAIDS, narcotics, muscle relaxer, OTC rubs creams patches, steroid burst and injections  sharp, shooting, stabbing, burning, throbbing, aching and numbness    Medications reviewed. Patient denies side effects with medications. Patient states she is taking medications as prescribed. Shedenies receiving pain medications from other sources. She denies any ER visits since last visit. Pain scale with out pain medications or at its worst is 8/10. Pain scale with pain medications or at its best is 5/10. Last dose of Tramadol  was 3-4 days ago   Drug screen reviewed from 4/2021 and was appropriate  Pill count was completed today and was appropriate  Patient does not have naloxone available at home. Patient has not required use of naloxone at home since last office visit. The patientis allergic to iodides. Subjective:      Review of Systems   Constitutional: Positive for activity change and fatigue. Negative for chills, diaphoresis and fever. Cardiovascular: Positive for leg swelling (right leg). Negative for chest pain and palpitations. Musculoskeletal: Positive for arthralgias, back pain, gait problem, myalgias and neck pain. Negative for neck stiffness. Skin: Negative for color change, rash and wound. Allergic/Immunologic: Positive for environmental allergies. Negative for food allergies. Neurological: Positive for numbness and headaches. Negative for dizziness, seizures and light-headedness. Hematological: Does not bruise/bleed easily. Psychiatric/Behavioral: Positive for sleep disturbance. The patient is not nervous/anxious. Objective:     Vitals:    07/15/21 0836   BP: 124/78   Site: Left Upper Arm   Position: Sitting   Cuff Size: Medium Adult   Pulse: 68   Weight: 128 lb (58.1 kg)   Height: 5' 4\" (1.626 m)       Physical Exam  Vitals reviewed. Constitutional:       General: She is not in acute distress. Appearance: She is well-developed. She is not diaphoretic. HENT:      Head: Normocephalic and atraumatic. Not macrocephalic and not microcephalic. Right Ear: External ear normal.      Left Ear: External ear normal.   Eyes:      General:         Right eye: No discharge. Left eye: No discharge. Conjunctiva/sclera: Conjunctivae normal.   Neck:      Trachea: No tracheal deviation. Pulmonary:      Effort: Pulmonary effort is normal. No respiratory distress. Musculoskeletal:         General: Tenderness present. Thoracic back: Spasms, tenderness and bony tenderness present. Decreased range of motion. Lumbar back: Spasms, tenderness and bony tenderness present. Decreased range of motion. Back:       Right hip: Bony tenderness present. Left hip: Bony tenderness present. Legs:    Skin:     General: Skin is warm and dry. Coloration: Skin is not pale. Findings: No rash. Neurological:      Mental Status: She is alert and oriented to person, place, and time. Cranial Nerves: No cranial nerve deficit. Psychiatric:         Attention and Perception: Attention and perception normal. She is attentive. Mood and Affect: Mood and affect normal.         Speech: Speech normal.         Behavior: Behavior normal.         Thought Content: Thought content normal.         Judgment: Judgment normal.       SUBHA test: +  Yeomans test:+  Gaenslen test: +     Assessment:     1. Lumbar spondylosis    2. Facet arthropathy of spine    3. Foraminal stenosis of lumbar region    4. Lumbar radiculopathy    5. Fibromyalgia    6. Sacroiliac inflammation ([address]    7. Chronic pain syndrome    8. Chronic, continuous use of opioids    9. Spasm of muscle    10. Post laminectomy syndrome            Plan:      · OARRS reviewed. Current MED:10   · Patient was not offered naloxone for home.    · Discussed long term side

## 2021-08-03 DIAGNOSIS — M79.7 FIBROMYALGIA: ICD-10-CM

## 2021-08-03 DIAGNOSIS — M48.061 FORAMINAL STENOSIS OF LUMBAR REGION: ICD-10-CM

## 2021-08-03 DIAGNOSIS — F11.90 CHRONIC, CONTINUOUS USE OF OPIOIDS: ICD-10-CM

## 2021-08-03 DIAGNOSIS — M46.1 SACROILIAC INFLAMMATION (HCC): ICD-10-CM

## 2021-08-03 DIAGNOSIS — M47.819 FACET ARTHROPATHY OF SPINE: ICD-10-CM

## 2021-08-03 DIAGNOSIS — M62.838 SPASM OF MUSCLE: ICD-10-CM

## 2021-08-03 DIAGNOSIS — M54.16 LUMBAR RADICULOPATHY: ICD-10-CM

## 2021-08-03 DIAGNOSIS — M96.1 POST LAMINECTOMY SYNDROME: ICD-10-CM

## 2021-08-03 DIAGNOSIS — G89.4 CHRONIC PAIN SYNDROME: ICD-10-CM

## 2021-08-03 DIAGNOSIS — M47.816 LUMBAR SPONDYLOSIS: ICD-10-CM

## 2021-08-03 RX ORDER — TRAMADOL HYDROCHLORIDE 50 MG/1
50 TABLET ORAL EVERY 8 HOURS PRN
Qty: 90 TABLET | Refills: 0 | Status: SHIPPED | OUTPATIENT
Start: 2021-08-03 | End: 2022-04-12 | Stop reason: SDUPTHER

## 2021-08-03 NOTE — TELEPHONE ENCOUNTER
OARRS reviewed. UDS: + for  Tramadol. Last seen: 7/15/2021.  Follow-up:   Future Appointments   Date Time Provider Tim Machado   10/7/2021  8:40 AM DOMI Thomson - CNP N [facility] Pain [facility] - [address] [facility]

## 2021-10-07 ENCOUNTER — OFFICE VISIT (OUTPATIENT)
Dept: PHYSICAL MEDICINE AND REHAB | Age: 66
End: 2021-10-07
Payer: MEDICARE

## 2021-10-07 VITALS
DIASTOLIC BLOOD PRESSURE: 76 MMHG | SYSTOLIC BLOOD PRESSURE: 126 MMHG | HEART RATE: 68 BPM | HEIGHT: 64 IN | BODY MASS INDEX: 22.2 KG/M2 | WEIGHT: 130 LBS

## 2021-10-07 DIAGNOSIS — M47.819 FACET ARTHROPATHY OF SPINE: ICD-10-CM

## 2021-10-07 DIAGNOSIS — M62.838 SPASM OF MUSCLE: ICD-10-CM

## 2021-10-07 DIAGNOSIS — M48.061 FORAMINAL STENOSIS OF LUMBAR REGION: ICD-10-CM

## 2021-10-07 DIAGNOSIS — G89.4 CHRONIC PAIN SYNDROME: ICD-10-CM

## 2021-10-07 DIAGNOSIS — M79.7 FIBROMYALGIA: ICD-10-CM

## 2021-10-07 DIAGNOSIS — M96.1 POST LAMINECTOMY SYNDROME: ICD-10-CM

## 2021-10-07 DIAGNOSIS — M54.16 LUMBAR RADICULOPATHY: ICD-10-CM

## 2021-10-07 DIAGNOSIS — M46.1 SACROILIAC INFLAMMATION (HCC): ICD-10-CM

## 2021-10-07 DIAGNOSIS — M47.816 LUMBAR SPONDYLOSIS: Primary | ICD-10-CM

## 2021-10-07 DIAGNOSIS — F11.90 CHRONIC, CONTINUOUS USE OF OPIOIDS: ICD-10-CM

## 2021-10-07 PROCEDURE — 99214 OFFICE O/P EST MOD 30 MIN: CPT | Performed by: NURSE PRACTITIONER

## 2021-10-07 ASSESSMENT — ENCOUNTER SYMPTOMS
COLOR CHANGE: 0
BACK PAIN: 1

## 2021-10-07 NOTE — PROGRESS NOTES
[address] [facility] [address]  Dept: [phone]  Dept Fax: [phone]: [phone]    Visit Date: 10/7/2021    Functionality Assessment/Goals Worksheet     On a scale of 0 (Does not Interfere) to 10 (Completely Interferes)     1. Which number describes how during the past week pain has interfered with       the following:  A. General Activity:  5  B. Mood: 5  C. Walking Ability:  5  D. Normal Work (Includes both work outside the home and housework):  6  E. Relations with Other People:   4  F. Sleep:   7  G. Enjoyment of Life:   5    2. Patient Prefers to Take their Pain Medications:     []  On a regular basis   [x]  Only when necessary    []  Does not take pain medications    3. What are the Patient's Goals/Expectations for Visiting Pain Management? [x]  Learn about my pain    []  Receive Medication   []  Physical Therapy     []  Treat Depression   []  Receive Injections    []  Treat Sleep   []  Deal with Anxiety and Stress   []  Treat Opoid Dependence/Addiction   []  Other:    HPI:   Ramos Garcia is a 78 y.o. female is here today for    Chief Complaint: Low back pain, Mid back pain and Hip pain    HPI   F/U no new health issues no changes  She continues to have low back mid back SI and now neck pain. She gardens and  crafts which has caused increased pain. She has had L5-S1 lumbar surgery in 2018 with Dr Jeanette Townsend, not much relief. She had surgical re eval and he recommended pain injections. SHe has had TFLESI L5 right  with minimal releif. We have discussed Lumbar Facet MBB L4-5,5-S1 bilateral or SI MBB she declines at this time due to  just having heart stents and medical bills. She continues to take Tramadol  very rarely and Mobic. She has had PT with dry needling ad cupping in past with some relief.  She has been unable to tolerate Neurontin Elavil CYmbalta  Pain increases with bending, lifting, twisting , reaching, pushing, walking, standing, stairs and getting up and down. Treatments Tried ice, heat, NSAIDS, narcotics, muscle relaxer, OTC rubs creams patches, steroid burst and injections  Pain Description sharp, shooting, stabbing, burning, aching, numbness, tingling and pins and needles    Medications reviewed. Patient denies side effects with medications. Patient states she is taking medications as prescribed. Shedenies receiving pain medications from other sources. She denies any ER visits since last visit. Pain scale with out pain medications or at its worst is 7/10. Pain scale with pain medications or at its best is 5/10. Last dose of Tramadol 2 days ago Drug screen reviewed from 7/2021 and was appropriate  Pill count was completed today and was appropriate Pt is out of meds:   Patient does not have naloxone available at home. Patient has not required use of naloxone at home since last office visit. The patientis allergic to iodides. Subjective:      Review of Systems   Constitutional: Positive for activity change and fatigue. Negative for chills, diaphoresis and fever. Cardiovascular: Positive for leg swelling (right leg). Negative for chest pain and palpitations. Musculoskeletal: Positive for arthralgias, back pain, gait problem, myalgias and neck pain. Negative for neck stiffness. Skin: Negative for color change, rash and wound. Allergic/Immunologic: Positive for environmental allergies. Negative for food allergies. Neurological: Positive for numbness and headaches. Negative for dizziness, seizures and light-headedness. Hematological: Does not bruise/bleed easily. Psychiatric/Behavioral: Positive for sleep disturbance. The patient is not nervous/anxious.         Objective:     Vitals:    10/07/21 0837   BP: 126/76   Site: Left Upper Arm   Position: Sitting   Cuff Size: Medium Adult   Pulse: 68   Weight: 130 lb (59 kg) Height: 5' 4\" (1.626 m)       Physical Exam  Vitals reviewed. Constitutional:       General: She is not in acute distress. Appearance: She is well-developed. She is not diaphoretic. HENT:      Head: Normocephalic and atraumatic. Not macrocephalic and not microcephalic. Right Ear: External ear normal.      Left Ear: External ear normal.   Eyes:      General:         Right eye: No discharge. Left eye: No discharge. Conjunctiva/sclera: Conjunctivae normal.   Neck:      Trachea: No tracheal deviation. Pulmonary:      Effort: Pulmonary effort is normal. No respiratory distress. Musculoskeletal:         General: Tenderness present. Thoracic back: Spasms, tenderness and bony tenderness present. Decreased range of motion. Lumbar back: Spasms, tenderness and bony tenderness present. Decreased range of motion. Back:       Right hip: Bony tenderness present. Left hip: Bony tenderness present. Legs:    Skin:     General: Skin is warm and dry. Coloration: Skin is not pale. Findings: No rash. Neurological:      Mental Status: She is alert and oriented to person, place, and time. Cranial Nerves: No cranial nerve deficit. Psychiatric:         Attention and Perception: Attention and perception normal. She is attentive. Mood and Affect: Mood and affect normal.         Speech: Speech normal.         Behavior: Behavior normal.         Thought Content: Thought content normal.         Judgment: Judgment normal.       SUBHA  Patricks test: +        Assessment:     1. Lumbar spondylosis    2. Facet arthropathy of spine    3. Foraminal stenosis of lumbar region    4. Lumbar radiculopathy    5. Fibromyalgia    6. Sacroiliac inflammation ([address]    7. Chronic pain syndrome    8. Chronic, continuous use of opioids    9. Spasm of muscle    10. Post laminectomy syndrome            Plan:      · OARRS reviewed.  Current MED: 15  · Patient was not offered naloxone for home. · Discussed long term side effects of medications, tolerance, dependency and addiction. · Previous UDS reviewed  · UDS preformed today for compliance. · Patient told can not receive any pain medications from any other source. · No evidence of abuse, diversion or aberrant behavior.  Medications and/or procedures to improve function and quality of life- patient understanding with this and that may not be pain free   Discussed with patient about safe storage of medications at home   Discussed possible weaning of medication dosing dependent on treatment/procedure results.  Testing:none    Procedures: none at this time   Discussed with patient about risks with procedure including infection, reaction to medication, increased pain, or bleeding.  Medications:Mobic Tramadol PRN    If patient is on blood thinners will need approval to hold:N/A      Meds. Prescribed:   No orders of the defined types were placed in this encounter. Return in about 3 months (around 1/7/2022) for Follow up pain medications.                Electronically signed by DOMI Frereira CNP on10/7/2021 at 8:51 AM

## 2021-11-16 ENCOUNTER — TELEPHONE (OUTPATIENT)
Dept: PHYSICAL MEDICINE AND REHAB | Age: 66
End: 2021-11-16

## 2021-11-16 RX ORDER — MELOXICAM 15 MG/1
15 TABLET ORAL DAILY PRN
Qty: 90 TABLET | Refills: 1 | Status: SHIPPED | OUTPATIENT
Start: 2021-11-16 | End: 2022-04-12 | Stop reason: SDUPTHER

## 2021-11-16 NOTE — TELEPHONE ENCOUNTER
Kaila Jaimes requested a refill of Mobic. Please call this into the patient's pharmacy. Statement Selected

## 2021-11-16 NOTE — TELEPHONE ENCOUNTER
OARRS reviewed. UDS:screen negative  . Last seen: 10/7/2021.  Follow-up:   Future Appointments   Date Time Provider Tim Jeannette   1/6/2022  9:00 AM DOMI Macdonald - CNP N [facility] [address] [facility]

## 2022-01-06 ENCOUNTER — OFFICE VISIT (OUTPATIENT)
Dept: PHYSICAL MEDICINE AND REHAB | Age: 67
End: 2022-01-06
Payer: MEDICARE

## 2022-01-06 VITALS
WEIGHT: 135 LBS | SYSTOLIC BLOOD PRESSURE: 118 MMHG | HEIGHT: 64 IN | HEART RATE: 62 BPM | DIASTOLIC BLOOD PRESSURE: 62 MMHG | BODY MASS INDEX: 23.05 KG/M2

## 2022-01-06 DIAGNOSIS — M79.7 FIBROMYALGIA: ICD-10-CM

## 2022-01-06 DIAGNOSIS — M54.16 LUMBAR RADICULOPATHY: ICD-10-CM

## 2022-01-06 DIAGNOSIS — G89.4 CHRONIC PAIN SYNDROME: ICD-10-CM

## 2022-01-06 DIAGNOSIS — M48.061 FORAMINAL STENOSIS OF LUMBAR REGION: ICD-10-CM

## 2022-01-06 DIAGNOSIS — M46.1 SACROILIAC INFLAMMATION (HCC): ICD-10-CM

## 2022-01-06 DIAGNOSIS — M96.1 POST LAMINECTOMY SYNDROME: ICD-10-CM

## 2022-01-06 DIAGNOSIS — M47.819 FACET ARTHROPATHY OF SPINE: ICD-10-CM

## 2022-01-06 DIAGNOSIS — M47.816 LUMBAR SPONDYLOSIS: Primary | ICD-10-CM

## 2022-01-06 PROCEDURE — 99214 OFFICE O/P EST MOD 30 MIN: CPT | Performed by: NURSE PRACTITIONER

## 2022-01-06 ASSESSMENT — ENCOUNTER SYMPTOMS
BACK PAIN: 1
COLOR CHANGE: 0

## 2022-01-06 NOTE — PROGRESS NOTES
[address] [facility] [place] [address]  Dept: [phone]  Dept Fax: [phone]: [phone]    Visit Date: 1/6/2022    Functionality Assessment/Goals Worksheet     On a scale of 0 (Does not Interfere) to 10 (Completely Interferes)     1. Which number describes how during the past week pain has interfered with       the following:  A. General Activity:  7  B. Mood: 6  C. Walking Ability:  6  D. Normal Work (Includes both work outside the home and housework):  6  E. Relations with Other People:   6  F. Sleep:   6  G. Enjoyment of Life:   6    2. Patient Prefers to Take their Pain Medications:     []  On a regular basis   [x]  Only when necessary    []  Does not take pain medications    3. What are the Patient's Goals/Expectations for Visiting Pain Management? [x]  Learn about my pain    []  Receive Medication   []  Physical Therapy     []  Treat Depression   []  Receive Injections    []  Treat Sleep   []  Deal with Anxiety and Stress   []  Treat Opoid Dependence/Addiction   []  Other:      HPI:   Dori Mejía is a 78 y.o. female is here today for    Chief Complaint: Low back pain, Mid back pain, Neck pain, Hip pain and SI pain    HPI   F/U continues to have mostly  Low back mid back SI pain. She  had increased pain over holidays sanding furniture. She gardens and  crafts which has caused increased pain. She has had L5-S1 lumbar surgery in 2018 with Dr Louis Irene, not much relief. She had surgical re eval and he recommended pain injections. SHe has had TFLESI L5 right  with minimal releif. We have discussed Lumbar Facet MBB L4-5,5-S1 bilateral or SI MBB she declines at this time due to  just having heart stents and medical bills. She continues to take Tramadol  very rarely and Mobic. She has had PT with dry needling ad cupping in past with some relief.  She has been unable to tolerate Neurontin Elavil Cymbalta  Pain increases with bending, lifting, twisting , reaching, pushing, walking, standing, stairs and getting up and down. Treatments Tried ice, heat, NSAIDS, narcotics, muscle relaxer, OTC rubs creams patches, steroid burst and injections  Pain Description sharp, shooting, stabbing, burning, aching, numbness, tingling and pins and needles       Pain increases with bending, lifting, twisting , reaching, pushing, pulling, walking, standing, stairs and getting up and down. Treatments Tried PT/HEP, ice, heat, NSAIDS, narcotics, muscle relaxer, OTC rubs creams patches, dry needling cupping acupuncture , steroid burst and injections  Pain Description sharp, shooting, stabbing, burning and aching    Medications reviewed. Patient denies side effects with medications. Patient states she is taking medications as prescribed. Shedenies receiving pain medications from other sources. She denies any ER visits since last visit. Pain scale with out pain medications or at its worst is 7/10. Pain scale with pain medications or at its best is 4/10. Last dose of Tramadol  was 5 days ago  Drug screen reviewed from10/2021and was appropriate  Pill count completed  today and WNL: Yes      The patientis allergic to iodides. Subjective:      Review of Systems   Constitutional: Positive for activity change and fatigue. Negative for chills, diaphoresis and fever. Cardiovascular: Positive for leg swelling (right leg). Negative for chest pain and palpitations. Musculoskeletal: Positive for arthralgias, back pain, gait problem, myalgias and neck pain. Negative for neck stiffness. Skin: Negative for color change, rash and wound. Allergic/Immunologic: Positive for environmental allergies. Negative for food allergies. Neurological: Positive for numbness and headaches. Negative for dizziness, seizures and light-headedness. Hematological: Does not bruise/bleed easily. Psychiatric/Behavioral: Positive for sleep disturbance. The patient is not nervous/anxious. Objective:     Vitals:    01/06/22 0855   BP: 118/62   Site: Left Upper Arm   Position: Sitting   Cuff Size: Medium Adult   Pulse: 62   Weight: 135 lb (61.2 kg)   Height: 5' 4\" (1.626 m)       Physical Exam  Vitals reviewed. Constitutional:       General: She is not in acute distress. Appearance: She is well-developed. She is not diaphoretic. HENT:      Head: Normocephalic and atraumatic. Not macrocephalic and not microcephalic. Right Ear: External ear normal.      Left Ear: External ear normal.   Eyes:      General:         Right eye: No discharge. Left eye: No discharge. Conjunctiva/sclera: Conjunctivae normal.   Neck:      Trachea: No tracheal deviation. Pulmonary:      Effort: Pulmonary effort is normal. No respiratory distress. Musculoskeletal:         General: Tenderness present. Thoracic back: Spasms, tenderness and bony tenderness present. Decreased range of motion. Lumbar back: Spasms, tenderness and bony tenderness present. Decreased range of motion. Back:       Right hip: Bony tenderness present. Left hip: Bony tenderness present. Legs:    Skin:     General: Skin is warm and dry. Coloration: Skin is not pale. Findings: No rash. Neurological:      Mental Status: She is alert and oriented to person, place, and time. Cranial Nerves: No cranial nerve deficit. Psychiatric:         Attention and Perception: Attention and perception normal. She is attentive. Mood and Affect: Mood and affect normal.         Speech: Speech normal.         Behavior: Behavior normal.         Thought Content: Thought content normal.         Judgment: Judgment normal.       SUBHA  Patricks test  positive  Yeoman's  positive  Gaenslen's  positive  Kemps  positive  Phalens Reverse Phalens  na  Tinels  na  Spurlings  na       Assessment:     1.  Lumbar spondylosis    2. Facet arthropathy of spine    3. Foraminal stenosis of lumbar region    4. Lumbar radiculopathy    5. Fibromyalgia    6. Sacroiliac inflammation ([address]    7. Chronic pain syndrome    8. Post laminectomy syndrome            Plan:      · OARRS reviewed. Current MED: 15  · Patient was not offered naloxone for home. · Discussed long term side effects of medications, tolerance, dependency and addiction. · Previous UDS reviewed  · UDS preformed today for compliance. · Patient told can not receive any pain medications from any other source. · No evidence of abuse, diversion or aberrant behavior.  Medications and/or procedures to improve function and quality of life- patient understanding with this and that may not be pain free   Discussed with patient about safe storage of medications at home   Discussed possible weaning of medication dosing dependent on treatment/procedure results.  Testing: none   Procedures: may repeat  If need   Discussed with patient about risks with procedure including infection, reaction to medication, increased pain, or bleeding.  Medications:Mobic Tramadol   If patient is on blood thinners will need approval to hold yes or no:N/A   Does patient have implanted device AICD or pacemaker etc? Will representative need notified prior to procedure yes or no:N/A      Meds. Prescribed:   No orders of the defined types were placed in this encounter. Return in about 3 months (around 4/6/2022).                Electronically signed by DOMI Vegas CNP on1/6/2022 at 9:07 AM

## 2022-04-12 ENCOUNTER — OFFICE VISIT (OUTPATIENT)
Dept: PHYSICAL MEDICINE AND REHAB | Age: 67
End: 2022-04-12
Payer: MEDICARE

## 2022-04-12 VITALS
SYSTOLIC BLOOD PRESSURE: 126 MMHG | DIASTOLIC BLOOD PRESSURE: 76 MMHG | WEIGHT: 136 LBS | HEIGHT: 64 IN | BODY MASS INDEX: 23.22 KG/M2 | HEART RATE: 66 BPM

## 2022-04-12 DIAGNOSIS — M48.061 FORAMINAL STENOSIS OF LUMBAR REGION: ICD-10-CM

## 2022-04-12 DIAGNOSIS — G89.4 CHRONIC PAIN SYNDROME: ICD-10-CM

## 2022-04-12 DIAGNOSIS — M96.1 POST LAMINECTOMY SYNDROME: ICD-10-CM

## 2022-04-12 DIAGNOSIS — M47.816 LUMBAR SPONDYLOSIS: Primary | ICD-10-CM

## 2022-04-12 DIAGNOSIS — M62.838 SPASM OF MUSCLE: ICD-10-CM

## 2022-04-12 DIAGNOSIS — F11.90 CHRONIC, CONTINUOUS USE OF OPIOIDS: ICD-10-CM

## 2022-04-12 DIAGNOSIS — M54.16 LUMBAR RADICULOPATHY: ICD-10-CM

## 2022-04-12 DIAGNOSIS — M46.1 SACROILIAC INFLAMMATION (HCC): ICD-10-CM

## 2022-04-12 DIAGNOSIS — M47.819 FACET ARTHROPATHY OF SPINE: ICD-10-CM

## 2022-04-12 DIAGNOSIS — M79.7 FIBROMYALGIA: ICD-10-CM

## 2022-04-12 PROCEDURE — 99214 OFFICE O/P EST MOD 30 MIN: CPT | Performed by: NURSE PRACTITIONER

## 2022-04-12 RX ORDER — MELOXICAM 15 MG/1
15 TABLET ORAL DAILY PRN
Qty: 90 TABLET | Refills: 1 | Status: SHIPPED | OUTPATIENT
Start: 2022-04-12 | End: 2022-09-07 | Stop reason: SDUPTHER

## 2022-04-12 RX ORDER — TRAMADOL HYDROCHLORIDE 50 MG/1
50 TABLET ORAL EVERY 8 HOURS PRN
Qty: 90 TABLET | Refills: 0 | Status: SHIPPED | OUTPATIENT
Start: 2022-04-12 | End: 2022-09-07 | Stop reason: SDUPTHER

## 2022-04-12 ASSESSMENT — ENCOUNTER SYMPTOMS
BACK PAIN: 1
COLOR CHANGE: 0

## 2022-04-12 NOTE — PROGRESS NOTES
[facility] [address]  Dept: [phone]  Dept Fax: [phone]: [phone]    Visit Date: 4/12/2022    Functionality Assessment/Goals Worksheet     On a scale of 0 (Does not Interfere) to 10 (Completely Interferes)     1. Which number describes how during the past week pain has interfered with       the following:  A. General Activity:  7  B. Mood: 0  C. Walking Ability:  0  D. Normal Work (Includes both work outside the home and housework):  0  E. Relations with Other People:   0  F. Sleep:   0  G. Enjoyment of Life:   0    2. Patient Prefers to Take their Pain Medications:     []  On a regular basis   [x]  Only when necessary    []  Does not take pain medications    3. What are the Patient's Goals/Expectations for Visiting Pain Management? [x]  Learn about my pain    []  Receive Medication   []  Physical Therapy     []  Treat Depression   []  Receive Injections    []  Treat Sleep   []  Deal with Anxiety and Stress   []  Treat Opoid Dependence/Addiction   []  Other:      HPI:   Belkis Yoo is a 78 y.o. female is here today for    Chief Complaint: Low back pain, Mid back pain, Neck pain, Hip pain and SI pain Fibromyalgia     HPI   F/U  States having thyroid issues and hand tremeors PCP made medication changes    She continues to have mostly  Low back mid back SI pain. She  had increased pain over holidays sanding furniture.     She gardens and  crafts which has caused increased pain. She has had L5-S1 lumbar surgery in 2018 with Dr Danis Mccarthy, not much relief. She had surgical re eval and he recommended pain injections.  SHe has had TFLESI L5 right  with minimal releif.  We have discussed Lumbar Facet MBB L4-5,5-S1 bilateral or SI MBB she declines at this time due to  just having heart stents and medical bills.  She continues to take Tramadol  very rarely and Mobic. She has had PT with dry needling ad cupping in past with some relief. She has been unable to tolerate Neurontin Elavil Cymbalta  Pain increases with bending, lifting, twisting , reaching, pushing, walking, standing, stairs and getting up and down. Treatments Tried ice, heat, NSAIDS, narcotics, muscle relaxer, OTC rubs creams patches, steroid burst and injections  Pain Description sharp, shooting, stabbing, burning, aching, numbness, tingling and pins and needles        Pain increases with bending, lifting, twisting , reaching, pushing, pulling, walking, standing, stairs and getting up and down. Treatments Tried PT/HEP, ice, heat, NSAIDS, narcotics, muscle relaxer, OTC rubs creams patches, dry needling cupping acupuncture , steroid burst and injections  Pain Description sharp, shooting, stabbing, burning and aching      . She denies any ER visits or new health issues since last visit. Pain scale with out pain medications or at its worst is 8/10. Pain scale with pain medications or at its best is 5/10. Last dose of Tramadol  was yesterday  Drug screen reviewed from 1/2022 and was appropriate  Pill count completed  today and WNL: Yes   The patientis allergic to iodides. Subjective:      Review of Systems   Constitutional: Positive for activity change and fatigue. Negative for chills, diaphoresis and fever. Cardiovascular: Positive for leg swelling (right leg). Negative for chest pain and palpitations. Musculoskeletal: Positive for arthralgias, back pain, gait problem, myalgias and neck pain. Negative for neck stiffness. Skin: Negative for color change, rash and wound. Allergic/Immunologic: Positive for environmental allergies. Negative for food allergies. Neurological: Positive for numbness and headaches. Negative for dizziness, seizures and light-headedness. Hematological: Does not bruise/bleed easily. Psychiatric/Behavioral: Positive for sleep disturbance.  The patient is not nervous/anxious. Objective:     Vitals:    04/12/22 0916   BP: 126/76   Site: Left Upper Arm   Position: Sitting   Cuff Size: Medium Adult   Pulse: 66   Weight: 136 lb (61.7 kg)   Height: 5' 4\" (1.626 m)       Physical Exam  Vitals reviewed. Constitutional:       General: She is not in acute distress. Appearance: She is well-developed. She is not diaphoretic. HENT:      Head: Normocephalic and atraumatic. Not macrocephalic and not microcephalic. Right Ear: External ear normal.      Left Ear: External ear normal.   Eyes:      General:         Right eye: No discharge. Left eye: No discharge. Conjunctiva/sclera: Conjunctivae normal.   Neck:      Trachea: No tracheal deviation. Pulmonary:      Effort: Pulmonary effort is normal. No respiratory distress. Musculoskeletal:         General: Tenderness present. Thoracic back: Spasms, tenderness and bony tenderness present. Decreased range of motion. Lumbar back: Spasms, tenderness and bony tenderness present. Decreased range of motion. Back:       Right hip: Bony tenderness present. Left hip: Bony tenderness present. Legs:    Skin:     General: Skin is warm and dry. Coloration: Skin is not pale. Findings: No rash. Neurological:      Mental Status: She is alert and oriented to person, place, and time. Cranial Nerves: No cranial nerve deficit. Psychiatric:         Attention and Perception: Attention and perception normal. She is attentive. Mood and Affect: Mood and affect normal.         Speech: Speech normal.         Behavior: Behavior normal.         Thought Content: Thought content normal.         Judgment: Judgment normal.       SUBHA  Patricks test  positive  Yeoman's  or Gaenslen's positive  Kemps  positive  Spurlings  na  Orozco's na         Assessment:     1. Lumbar spondylosis    2. Facet arthropathy of spine    3. Foraminal stenosis of lumbar region    4.  Lumbar radiculopathy    5. Fibromyalgia    6. Sacroiliac inflammation ([facility] [address]    7. Chronic pain syndrome    8. Post laminectomy syndrome    9. Chronic, continuous use of opioids    10. Spasm of muscle            Plan:      · OARRS reviewed. Current MED: 15  · Patient was not offered naloxone for home.  Testing Labs or Radiology reviewed:    Testing Labs or Radiology ordered:   Procedures:discussed facet series   Discussed with patient about risks with procedure including infection, reaction to medication, increased pain, or bleeding.  Medications:Mobic Tramadol         Meds. Prescribed:   Orders Placed This Encounter   Medications    traMADol (ULTRAM) 50 MG tablet     Sig: Take 1 tablet by mouth every 8 hours as needed for Pain for up to 30 days. Dispense:  90 tablet     Refill:  0     Reduce doses taken as pain becomes manageable    meloxicam (MOBIC) 15 MG tablet     Sig: Take 1 tablet by mouth daily as needed for Pain     Dispense:  90 tablet     Refill:  1       Return in about 3 months (around 7/12/2022) for Follow up pain medications.        Electronically signed by DOMI De La Torre CNP on4/12/2022 at 9:29 AM

## 2022-09-07 ENCOUNTER — OFFICE VISIT (OUTPATIENT)
Dept: PHYSICAL MEDICINE AND REHAB | Age: 67
End: 2022-09-07
Payer: MEDICARE

## 2022-09-07 VITALS
HEART RATE: 68 BPM | WEIGHT: 135 LBS | SYSTOLIC BLOOD PRESSURE: 114 MMHG | DIASTOLIC BLOOD PRESSURE: 70 MMHG | HEIGHT: 64 IN | BODY MASS INDEX: 23.05 KG/M2

## 2022-09-07 DIAGNOSIS — M48.061 FORAMINAL STENOSIS OF LUMBAR REGION: ICD-10-CM

## 2022-09-07 DIAGNOSIS — M96.1 POST LAMINECTOMY SYNDROME: ICD-10-CM

## 2022-09-07 DIAGNOSIS — M47.816 LUMBAR SPONDYLOSIS: ICD-10-CM

## 2022-09-07 DIAGNOSIS — M47.819 FACET ARTHROPATHY OF SPINE: ICD-10-CM

## 2022-09-07 DIAGNOSIS — M79.7 FIBROMYALGIA: ICD-10-CM

## 2022-09-07 DIAGNOSIS — M46.1 SACROILIAC INFLAMMATION (HCC): ICD-10-CM

## 2022-09-07 DIAGNOSIS — G89.4 CHRONIC PAIN SYNDROME: ICD-10-CM

## 2022-09-07 DIAGNOSIS — F11.90 CHRONIC, CONTINUOUS USE OF OPIOIDS: ICD-10-CM

## 2022-09-07 DIAGNOSIS — M54.16 LUMBAR RADICULOPATHY: ICD-10-CM

## 2022-09-07 DIAGNOSIS — M62.838 SPASM OF MUSCLE: ICD-10-CM

## 2022-09-07 PROCEDURE — 1123F ACP DISCUSS/DSCN MKR DOCD: CPT | Performed by: NURSE PRACTITIONER

## 2022-09-07 PROCEDURE — 99214 OFFICE O/P EST MOD 30 MIN: CPT | Performed by: NURSE PRACTITIONER

## 2022-09-07 RX ORDER — MELOXICAM 15 MG/1
15 TABLET ORAL DAILY PRN
Qty: 90 TABLET | Refills: 1 | Status: SHIPPED | OUTPATIENT
Start: 2022-09-07 | End: 2022-12-06

## 2022-09-07 RX ORDER — TRAMADOL HYDROCHLORIDE 50 MG/1
50 TABLET ORAL EVERY 8 HOURS PRN
Qty: 90 TABLET | Refills: 0 | Status: SHIPPED | OUTPATIENT
Start: 2022-09-07 | End: 2022-10-07

## 2022-09-07 ASSESSMENT — ENCOUNTER SYMPTOMS
COLOR CHANGE: 0
BACK PAIN: 1

## 2022-09-07 NOTE — PROGRESS NOTES
[address] [facility] [address]  Dept: [phone]  Dept Fax: [phone]: [phone]    Visit Date: 9/7/2022    Functionality Assessment/Goals Worksheet     On a scale of 0 (Does not Interfere) to 10 (Completely Interferes)     1. Which number describes how during the past week pain has interfered with       the following:  A. General Activity:  7  B. Mood: 7  C. Walking Ability:  5  D. Normal Work (Includes both work outside the home and housework):  5  E. Relations with Other People:   5  F. Sleep:   8  G. Enjoyment of Life:   7    2. Patient Prefers to Take their Pain Medications:     []  On a regular basis   [x]  Only when necessary    []  Does not take pain medications    3. What are the Patient's Goals/Expectations for Visiting Pain Management? [x]  Learn about my pain    [x]  Receive Medication   []  Physical Therapy     []  Treat Depression   []  Receive Injections    []  Treat Sleep   []  Deal with Anxiety and Stress   []  Treat Opoid Dependence/Addiction   []  Other:    HPI:   Janki Ladd is a 75 y.o. female is here today for    Chief Complaint: Low back pain, Mid back pain, Neck pain, Hip pain, and SI pain Fibromyalgia     F/U no new changes. She continues to have mostly  Low back mid back SI pain. She  had increased pain over holidays sanding furniture. She gardens and  crafts which has caused increased pain. She has had L5-S1 lumbar surgery in 2018 with Dr Thuy Tracey, not much relief. She had surgical re eval and he recommended pain injections. SHe has had TFLESI L5 right  with minimal releif. We have discussed Lumbar Facet MBB L4-5,5-S1 bilateral or SI MBB she declines at this time due to  just having heart stents and medical bills. She continues to take Tramadol  very rarely and Mobic.  She has had PT with dry needling ad cupping in past with some relief. She has been unable to tolerate Neurontin Elavil Cymbalta  Pain increases with bending, lifting, twisting , reaching, pushing, walking, standing, stairs and getting up and down. Treatments Tried ice, heat, NSAIDS, narcotics, muscle relaxer, OTC rubs creams patches, steroid burst and injections  Pain Description sharp, shooting, stabbing, burning, aching, numbness, tingling and pins and needles        Pain increases with bending, lifting, twisting , reaching, pushing, pulling, walking, standing, stairs and getting up and down. Treatments Tried PT/HEP, ice, heat, NSAIDS, narcotics, muscle relaxer, OTC rubs creams patches, dry needling cupping acupuncture , steroid burst and injections  Pain Description sharp, shooting, stabbing, burning and aching      She denies any ER visits or new health issues since last visit. Pain scale with out pain medications or at its worst is 7/10. Pain scale with pain medications or at its best is 4/10. Last dose of Tramadol  was  2 days ago   Drug screen reviewed from 4/2022 and was appropriate  Pill count completed  today and WNL: Yes       The patientis allergic to iodides. Subjective:      Review of Systems   Constitutional:  Positive for activity change and fatigue. Negative for chills, diaphoresis and fever. Cardiovascular:  Positive for leg swelling (right leg). Negative for chest pain and palpitations. Musculoskeletal:  Positive for arthralgias, back pain, gait problem, myalgias and neck pain. Negative for neck stiffness. Skin:  Negative for color change, rash and wound. Allergic/Immunologic: Positive for environmental allergies. Negative for food allergies. Neurological:  Positive for numbness and headaches. Negative for dizziness, seizures and light-headedness. Hematological:  Does not bruise/bleed easily. Psychiatric/Behavioral:  Positive for sleep disturbance. The patient is not nervous/anxious.       Objective:     Vitals:    09/07/22 1035   BP: 114/70   Site: Left Upper Arm   Position: Sitting   Cuff Size: Medium Adult   Pulse: 68   Weight: 135 lb (61.2 kg)   Height: 5' 4\" (1.626 m)       Physical Exam  Vitals reviewed. Constitutional:       General: She is not in acute distress. Appearance: She is well-developed. She is not diaphoretic. HENT:      Head: Normocephalic and atraumatic. Not macrocephalic and not microcephalic. Right Ear: External ear normal.      Left Ear: External ear normal.   Eyes:      General:         Right eye: No discharge. Left eye: No discharge. Conjunctiva/sclera: Conjunctivae normal.   Neck:      Trachea: No tracheal deviation. Pulmonary:      Effort: Pulmonary effort is normal. No respiratory distress. Musculoskeletal:         General: Tenderness present. Thoracic back: Spasms, tenderness and bony tenderness present. Decreased range of motion. Lumbar back: Spasms, tenderness and bony tenderness present. Decreased range of motion. Back:       Right hip: Bony tenderness present. Left hip: Bony tenderness present. Legs:    Skin:     General: Skin is warm and dry. Coloration: Skin is not pale. Findings: No rash. Neurological:      Mental Status: She is alert and oriented to person, place, and time. Cranial Nerves: No cranial nerve deficit. Psychiatric:         Attention and Perception: Attention and perception normal. She is attentive. Mood and Affect: Mood and affect normal.         Speech: Speech normal.         Behavior: Behavior normal.         Thought Content: Thought content normal.         Judgment: Judgment normal.     SUBHA  Patricks test  positive  Yeoman's  or Gaenslen's positive  Kemps  positive  Spurlings  positive  Orozco's na         Assessment:     1. Lumbar spondylosis    2. Facet arthropathy of spine    3. Foraminal stenosis of lumbar region    4. Lumbar radiculopathy    5. Fibromyalgia    6.  [facility]ili[facility] inflammation ([facility]    7. Chronic pain syndrome    8. Post laminectomy syndrome    9. Chronic, continuous use of opioids    10. Spasm of muscle            Plan:      OARRS reviewed. Current MED: 15  Patient was not offered naloxone for home. Procedures:discussed  many  unable to afford    Medications:Mobic Tramadol       Meds. Prescribed:   Orders Placed This Encounter   Medications    traMADol (ULTRAM) 50 MG tablet     Sig: Take 1 tablet by mouth every 8 hours as needed for Pain for up to 30 days. Dispense:  90 tablet     Refill:  0     Reduce doses taken as pain becomes manageable    meloxicam (MOBIC) 15 MG tablet     Sig: Take 1 tablet by mouth daily as needed for Pain     Dispense:  90 tablet     Refill:  1       Return in about 3 months (around 12/7/2022).                Electronically signed by DOMI Valle CNP on9/7/2022 at 10:49 AM

## 2022-12-07 ENCOUNTER — OFFICE VISIT (OUTPATIENT)
Dept: PHYSICAL MEDICINE AND REHAB | Age: 67
End: 2022-12-07
Payer: MEDICARE

## 2022-12-07 VITALS
DIASTOLIC BLOOD PRESSURE: 68 MMHG | HEIGHT: 64 IN | SYSTOLIC BLOOD PRESSURE: 112 MMHG | WEIGHT: 136 LBS | BODY MASS INDEX: 23.22 KG/M2 | HEART RATE: 64 BPM

## 2022-12-07 DIAGNOSIS — M62.838 SPASM OF MUSCLE: ICD-10-CM

## 2022-12-07 DIAGNOSIS — M79.7 FIBROMYALGIA: ICD-10-CM

## 2022-12-07 DIAGNOSIS — M54.16 LUMBAR RADICULOPATHY: ICD-10-CM

## 2022-12-07 DIAGNOSIS — M47.816 LUMBAR SPONDYLOSIS: Primary | ICD-10-CM

## 2022-12-07 DIAGNOSIS — M48.061 FORAMINAL STENOSIS OF LUMBAR REGION: ICD-10-CM

## 2022-12-07 DIAGNOSIS — M96.1 POST LAMINECTOMY SYNDROME: ICD-10-CM

## 2022-12-07 DIAGNOSIS — M46.1 SACROILIAC INFLAMMATION (HCC): ICD-10-CM

## 2022-12-07 DIAGNOSIS — F11.90 CHRONIC, CONTINUOUS USE OF OPIOIDS: ICD-10-CM

## 2022-12-07 DIAGNOSIS — G89.4 CHRONIC PAIN SYNDROME: ICD-10-CM

## 2022-12-07 DIAGNOSIS — M47.819 FACET ARTHROPATHY OF SPINE: ICD-10-CM

## 2022-12-07 PROCEDURE — 99214 OFFICE O/P EST MOD 30 MIN: CPT | Performed by: NURSE PRACTITIONER

## 2022-12-07 PROCEDURE — 1123F ACP DISCUSS/DSCN MKR DOCD: CPT | Performed by: NURSE PRACTITIONER

## 2022-12-07 RX ORDER — TRAMADOL HYDROCHLORIDE 50 MG/1
50 TABLET ORAL EVERY 8 HOURS PRN
Qty: 90 TABLET | Refills: 0 | Status: SHIPPED | OUTPATIENT
Start: 2022-12-07 | End: 2023-01-06

## 2022-12-07 RX ORDER — MELOXICAM 15 MG/1
15 TABLET ORAL DAILY PRN
Qty: 90 TABLET | Refills: 1 | Status: SHIPPED | OUTPATIENT
Start: 2022-12-07 | End: 2023-03-07

## 2022-12-07 ASSESSMENT — ENCOUNTER SYMPTOMS
BACK PAIN: 1
COLOR CHANGE: 0

## 2022-12-07 NOTE — PROGRESS NOTES
[facility] [address]  Dept: [phone]  Dept Fax: [phone]: [phone]    Visit Date: 12/7/2022    Functionality Assessment/Goals Worksheet     On a scale of 0 (Does not Interfere) to 10 (Completely Interferes)     1. Which number describes how during the past week pain has interfered with       the following:  A. General Activity:  8  B. Mood: 8  C. Walking Ability:  6  D. Normal Work (Includes both work outside the home and housework):  8  E. Relations with Other People:   8  F. Sleep:   9  G. Enjoyment of Life:   9    2. Patient Prefers to Take their Pain Medications:     []  On a regular basis   [x]  Only when necessary    []  Does not take pain medications    3. What are the Patient's Goals/Expectations for Visiting Pain Management? [x]  Learn about my pain    []  Receive Medication   []  Physical Therapy     []  Treat Depression   []  Receive Injections    []  Treat Sleep   []  Deal with Anxiety and Stress   []  Treat Opoid Dependence/Addiction   []  Other:      HPI:   Rosa Jason is a 75 y.o. female is here today for    Chief Complaint: Lumbar back pain, Thoracic back pain , cervical pain , Hip pain, SI pain, and Fibromyalgia      She continues to have mostly  Low back mid back SI pain. She  had increased pain over holidays sanding furniture. Increased pain over this angel season. She gardens and  crafts which has caused increased pain. She has had L5-S1 lumbar surgery in 2018 with Dr Kali Bowman, not much relief. She had surgical re eval and he recommended pain injections. SHe has had TFLESI L5 right  with minimal releif. We have discussed Lumbar Facet MBB L4-5,5-S1 bilateral or SI MBB she declines at this time due to  just having heart stents and medical bills. She continues to take Tramadol  very rarely and Mobic.  She has had PT with dry needling ad cupping in past with some relief. She has been unable to tolerate Neurontin Elavil Cymbalta  Pain increases with bending, lifting, twisting , reaching, pushing, walking, standing, stairs and getting up and down. Treatments Tried ice, heat, NSAIDS, narcotics, muscle relaxer, OTC rubs creams patches, steroid burst and injections  Pain Description sharp, shooting, stabbing, burning, aching, numbness, tingling and pins and needles        Pain increases with bending, lifting, twisting , reaching, pushing, pulling, walking, standing, stairs and getting up and down. Treatments Tried PT/HEP, ice, heat, NSAIDS, narcotics, muscle relaxer, OTC rubs creams patches, dry needling cupping acupuncture , steroid burst and injections  Pain Description sharp, shooting, stabbing, burning and aching     She denies any ER visits or new health issues since last visit. Pain scale with out pain medications or at its worst is 9/10. Pain scale with pain medications or at its best is 4/10. Last dose of Tramadol  was yesterday  Drug screen reviewed from 9/2022 and was appropriate  Pill count completed  today and WNL: Yes         The patientis allergic to iodides. Subjective:      Review of Systems   Constitutional:  Positive for activity change and fatigue. Negative for chills, diaphoresis and fever. Cardiovascular:  Positive for leg swelling (right leg). Negative for chest pain and palpitations. Musculoskeletal:  Positive for arthralgias, back pain, gait problem, myalgias and neck pain. Negative for neck stiffness. Skin:  Negative for color change, rash and wound. Allergic/Immunologic: Positive for environmental allergies. Negative for food allergies. Neurological:  Positive for numbness and headaches. Negative for dizziness, seizures and light-headedness. Hematological:  Does not bruise/bleed easily. Psychiatric/Behavioral:  Positive for sleep disturbance. The patient is not nervous/anxious. Objective:     Vitals:    12/07/22 1107   BP: 112/68   Site: Left Upper Arm   Position: Sitting   Cuff Size: Medium Adult   Pulse: 64   Weight: 136 lb (61.7 kg)   Height: 5' 4\" (1.626 m)       Physical Exam  Vitals reviewed. Constitutional:       General: She is not in acute distress. Appearance: She is well-developed. She is not diaphoretic. HENT:      Head: Normocephalic and atraumatic. Not macrocephalic and not microcephalic. Right Ear: External ear normal.      Left Ear: External ear normal.   Eyes:      General:         Right eye: No discharge. Left eye: No discharge. Conjunctiva/sclera: Conjunctivae normal.   Neck:      Trachea: No tracheal deviation. Pulmonary:      Effort: Pulmonary effort is normal. No respiratory distress. Musculoskeletal:         General: Tenderness present. Thoracic back: Spasms, tenderness and bony tenderness present. Decreased range of motion. Lumbar back: Spasms, tenderness and bony tenderness present. Decreased range of motion. Back:       Right hip: Bony tenderness present. Left hip: Bony tenderness present. Legs:    Skin:     General: Skin is warm and dry. Coloration: Skin is not pale. Findings: No rash. Neurological:      Mental Status: She is alert and oriented to person, place, and time. Cranial Nerves: No cranial nerve deficit. Psychiatric:         Attention and Perception: Attention and perception normal. She is attentive. Mood and Affect: Mood and affect normal.         Speech: Speech normal.         Behavior: Behavior normal.         Thought Content: Thought content normal.         Judgment: Judgment normal.     SUBHA  Patricks test  positive  Yeoman's  or Gaenslen's positive  Kemps  positive  Spurlings  positive  Orozco's na         Assessment:     1. Lumbar spondylosis    2. Facet arthropathy of spine    3. Foraminal stenosis of lumbar region    4. Lumbar radiculopathy    5. Fibromyalgia    6. Sacroiliac inflammation ([facility] [address]    7. Post laminectomy syndrome    8. Chronic pain syndrome    9. Chronic, continuous use of opioids    10. Spasm of muscle            Plan:      OARRS reviewed. Current MED: 15  Patient was not offered naloxone for home. Procedures:none  unable to afford  Medications:Tramadol Mobic       Meds. Prescribed:   Orders Placed This Encounter   Medications    meloxicam (MOBIC) 15 MG tablet     Sig: Take 1 tablet by mouth daily as needed for Pain     Dispense:  90 tablet     Refill:  1    traMADol (ULTRAM) 50 MG tablet     Sig: Take 1 tablet by mouth every 8 hours as needed for Pain for up to 30 days. Dispense:  90 tablet     Refill:  0     Reduce doses taken as pain becomes manageable       Return in about 3 months (around 3/7/2023).        Electronically signed by DOMI Huizar CNP on12/7/2022 at 11:27 AM

## 2023-03-02 ENCOUNTER — OFFICE VISIT (OUTPATIENT)
Dept: PHYSICAL MEDICINE AND REHAB | Age: 68
End: 2023-03-02
Payer: MEDICARE

## 2023-03-02 VITALS
SYSTOLIC BLOOD PRESSURE: 118 MMHG | HEIGHT: 64 IN | WEIGHT: 135 LBS | HEART RATE: 62 BPM | BODY MASS INDEX: 23.05 KG/M2 | DIASTOLIC BLOOD PRESSURE: 60 MMHG

## 2023-03-02 DIAGNOSIS — M48.061 FORAMINAL STENOSIS OF LUMBAR REGION: ICD-10-CM

## 2023-03-02 DIAGNOSIS — M79.7 FIBROMYALGIA: ICD-10-CM

## 2023-03-02 DIAGNOSIS — M54.16 LUMBAR RADICULOPATHY: ICD-10-CM

## 2023-03-02 DIAGNOSIS — M47.816 LUMBAR SPONDYLOSIS: Primary | ICD-10-CM

## 2023-03-02 DIAGNOSIS — G89.4 CHRONIC PAIN SYNDROME: ICD-10-CM

## 2023-03-02 DIAGNOSIS — M46.1 SACROILIAC INFLAMMATION (HCC): ICD-10-CM

## 2023-03-02 DIAGNOSIS — M96.1 POST LAMINECTOMY SYNDROME: ICD-10-CM

## 2023-03-02 DIAGNOSIS — M47.819 FACET ARTHROPATHY OF SPINE: ICD-10-CM

## 2023-03-02 DIAGNOSIS — F11.90 CHRONIC, CONTINUOUS USE OF OPIOIDS: ICD-10-CM

## 2023-03-02 DIAGNOSIS — M62.838 SPASM OF MUSCLE: ICD-10-CM

## 2023-03-02 PROCEDURE — 1123F ACP DISCUSS/DSCN MKR DOCD: CPT | Performed by: NURSE PRACTITIONER

## 2023-03-02 PROCEDURE — G8427 DOCREV CUR MEDS BY ELIG CLIN: HCPCS | Performed by: NURSE PRACTITIONER

## 2023-03-02 PROCEDURE — G8484 FLU IMMUNIZE NO ADMIN: HCPCS | Performed by: NURSE PRACTITIONER

## 2023-03-02 PROCEDURE — G8420 CALC BMI NORM PARAMETERS: HCPCS | Performed by: NURSE PRACTITIONER

## 2023-03-02 PROCEDURE — 99214 OFFICE O/P EST MOD 30 MIN: CPT | Performed by: NURSE PRACTITIONER

## 2023-03-02 PROCEDURE — 3017F COLORECTAL CA SCREEN DOC REV: CPT | Performed by: NURSE PRACTITIONER

## 2023-03-02 PROCEDURE — G8400 PT W/DXA NO RESULTS DOC: HCPCS | Performed by: NURSE PRACTITIONER

## 2023-03-02 PROCEDURE — 1090F PRES/ABSN URINE INCON ASSESS: CPT | Performed by: NURSE PRACTITIONER

## 2023-03-02 PROCEDURE — 4004F PT TOBACCO SCREEN RCVD TLK: CPT | Performed by: NURSE PRACTITIONER

## 2023-03-02 RX ORDER — TRAMADOL HYDROCHLORIDE 50 MG/1
50 TABLET ORAL EVERY 8 HOURS PRN
Qty: 90 TABLET | Refills: 0 | Status: SHIPPED | OUTPATIENT
Start: 2023-03-02 | End: 2023-04-01

## 2023-03-02 RX ORDER — MELOXICAM 15 MG/1
15 TABLET ORAL DAILY PRN
Qty: 90 TABLET | Refills: 3 | Status: SHIPPED | OUTPATIENT
Start: 2023-03-02 | End: 2023-05-31

## 2023-03-02 ASSESSMENT — ENCOUNTER SYMPTOMS
BACK PAIN: 1
COLOR CHANGE: 0

## 2023-03-02 NOTE — PROGRESS NOTES
HPI:   Graciela Fregoso is a 67 y.o. female is here today for    Chief Complaint: Lumbar back pain, Thoracic back pain , cervical pain , Hip pain, SI pain, and Fibromyalgia      F/U no new changes. She gardens and  crafts which has caused increased pain.         She has had L5-S1 lumbar surgery in 2018 with Dr Batres, not much relief. She had surgical re eval and he recommended pain injections.  SHe has had TFLESI L5 right  with minimal releif.  We have discussed Lumbar Facet MBB L4-5,5-S1 bilateral or SI MBB she declines at this time due to  just having heart stents and medical bills. She continues to take Tramadol  very rarely and Mobic. She has had PT with dry needling ad cupping in past with some relief. She has been unable to tolerate Neurontin Elavil Cymbalta  Pain increases with bending, lifting, twisting , reaching, pushing, walking, standing, stairs and getting up and down.  Treatments Tried ice, heat, NSAIDS, narcotics, muscle relaxer, OTC rubs creams patches, steroid burst and injections  Pain Description sharp, shooting, stabbing, burning, aching, numbness, tingling and pins and needles        Pain increases with bending, lifting, twisting , reaching, pushing, pulling, walking, standing, stairs and getting up and down.  Treatments Tried PT/HEP, ice, heat, NSAIDS, narcotics, muscle relaxer, OTC rubs creams patches, dry needling cupping acupuncture , steroid burst and injections  Pain Description sharp, shooting, stabbing, burning and aching     She denies any ER visits or new health issues since last visit.    Pain scale with out pain medications or at its worst is 8/10. Had to sit on bleachers for 2 hour basketball tournament last night.   Pain scale with pain medications or at its best is 5/10.  Last dose of Tramadol  was yesterday  Drug screen reviewed from 12/2022 and was appropriate  Pill count completed  today and WNL: Yes         Prior Injections:  SHe has had TFLESI L5 right  with  minimal releif. We have discussed Lumbar Facet MBB L4-5,5-S1 bilateral or SI         Radiology:            The patientis allergic to iodides. Subjective:      Review of Systems   Constitutional:  Positive for activity change and fatigue. Negative for chills, diaphoresis and fever. Eyes:  Positive for visual disturbance. Pending eye   May 2023   Cardiovascular:  Positive for leg swelling (right leg). Negative for chest pain and palpitations. Musculoskeletal:  Positive for arthralgias, back pain, gait problem, myalgias and neck pain. Negative for neck stiffness. Skin:  Negative for color change, rash and wound. Allergic/Immunologic: Positive for environmental allergies. Negative for food allergies. Neurological:  Positive for numbness and headaches. Negative for dizziness, seizures and light-headedness. Hematological:  Does not bruise/bleed easily. Psychiatric/Behavioral:  Positive for sleep disturbance. The patient is not nervous/anxious. Objective:     Vitals:    03/02/23 1432   BP: 118/60   Site: Left Upper Arm   Position: Sitting   Cuff Size: Medium Adult   Pulse: 62   Weight: 135 lb (61.2 kg)   Height: 5' 4\" (1.626 m)       Physical Exam  Vitals reviewed. Constitutional:       General: She is not in acute distress. Appearance: She is well-developed. She is not diaphoretic. HENT:      Head: Normocephalic and atraumatic. Not macrocephalic and not microcephalic. Right Ear: External ear normal.      Left Ear: External ear normal.   Eyes:      General:         Right eye: No discharge. Left eye: No discharge. Conjunctiva/sclera: Conjunctivae normal.   Neck:      Trachea: No tracheal deviation. Pulmonary:      Effort: Pulmonary effort is normal. No respiratory distress. Musculoskeletal:         General: Tenderness present. Thoracic back: Spasms, tenderness and bony tenderness present. Decreased range of motion.       Lumbar back: Spasms, tenderness and bony tenderness present. Decreased range of motion. Back:       Right hip: Bony tenderness present. Left hip: Bony tenderness present. Legs:    Skin:     General: Skin is warm and dry. Coloration: Skin is not pale. Findings: No rash. Neurological:      Mental Status: She is alert and oriented to person, place, and time. Cranial Nerves: No cranial nerve deficit. Psychiatric:         Attention and Perception: Attention and perception normal. She is attentive. Mood and Affect: Mood and affect normal.         Speech: Speech normal.         Behavior: Behavior normal.         Thought Content: Thought content normal.         Judgment: Judgment normal.     SUBHA  Patricks test  positive  Yeoman's  or Gaenslen's positive  Kemps  positive  Spurlings  positive  Orozco's na         Assessment:     1. Lumbar spondylosis    2. Facet arthropathy of spine    3. Foraminal stenosis of lumbar region    4. Fibromyalgia    5. Lumbar radiculopathy    6. Sacroiliac inflammation ([facility] [place] [address]    7. Post laminectomy syndrome    8. Chronic pain syndrome    9. Chronic, continuous use of opioids    10. Spasm of muscle            Plan:      OARRS reviewed. Current MED: 15  Patient was not offered naloxone for home. Testing Labs or Radiology reviewed: Lumbar   Procedures:unable to afford   Discussed with patient about risks with procedure including infection, reaction to medication, increased pain, or bleeding. Medications:Tramadol Mobic         Meds. Prescribed:   Orders Placed This Encounter   Medications    traMADol (ULTRAM) 50 MG tablet     Sig: Take 1 tablet by mouth every 8 hours as needed for Pain for up to 30 days. Dispense:  90 tablet     Refill:  0     Reduce doses taken as pain becomes manageable    meloxicam (MOBIC) 15 MG tablet     Sig: Take 1 tablet by mouth daily as needed for Pain     Dispense:  90 tablet     Refill:  3         Return in about 3 months (around 6/2/2023). Electronically signed by DOMI Castillo - CNP on3/2/2023 at 2:48 PM

## 2023-03-02 NOTE — PROGRESS NOTES
[address] [facility] [address]  Dept: [phone]  Dept Fax: [phone]: [phone]    Visit Date: 3/2/2023    Functionality Assessment/Goals Worksheet     On a scale of 0 (Does not Interfere) to 10 (Completely Interferes)     1. Which number describes how during the past week pain has interfered with       the following:  A. General Activity:  6  B. Mood: 6  C. Walking Ability:  6  D. Normal Work (Includes both work outside the home and housework):  6  E. Relations with Other People:   6  F. Sleep:   7  G. Enjoyment of Life:   6    2. Patient Prefers to Take their Pain Medications:     [x]  On a regular basis   []  Only when necessary    []  Does not take pain medications    3. What are the Patient's Goals/Expectations for Visiting Pain Management?      []  Learn about my pain    [x]  Receive Medication   []  Physical Therapy     []  Treat Depression   []  Receive Injections    []  Treat Sleep   []  Deal with Anxiety and Stress   []  Treat Opoid Dependence/Addiction   []  Other:

## 2023-06-01 ENCOUNTER — OFFICE VISIT (OUTPATIENT)
Dept: PHYSICAL MEDICINE AND REHAB | Age: 68
End: 2023-06-01
Payer: MEDICARE

## 2023-06-01 VITALS
SYSTOLIC BLOOD PRESSURE: 122 MMHG | BODY MASS INDEX: 22.88 KG/M2 | WEIGHT: 134 LBS | HEIGHT: 64 IN | DIASTOLIC BLOOD PRESSURE: 64 MMHG | HEART RATE: 60 BPM

## 2023-06-01 DIAGNOSIS — M79.7 FIBROMYALGIA: ICD-10-CM

## 2023-06-01 DIAGNOSIS — M46.1 SACROILIAC INFLAMMATION (HCC): ICD-10-CM

## 2023-06-01 DIAGNOSIS — M54.16 LUMBAR RADICULOPATHY: ICD-10-CM

## 2023-06-01 DIAGNOSIS — M48.061 FORAMINAL STENOSIS OF LUMBAR REGION: ICD-10-CM

## 2023-06-01 DIAGNOSIS — F11.90 CHRONIC, CONTINUOUS USE OF OPIOIDS: ICD-10-CM

## 2023-06-01 DIAGNOSIS — M47.816 LUMBAR SPONDYLOSIS: Primary | ICD-10-CM

## 2023-06-01 DIAGNOSIS — M96.1 POST LAMINECTOMY SYNDROME: ICD-10-CM

## 2023-06-01 DIAGNOSIS — M62.838 SPASM OF MUSCLE: ICD-10-CM

## 2023-06-01 DIAGNOSIS — G89.4 CHRONIC PAIN SYNDROME: ICD-10-CM

## 2023-06-01 DIAGNOSIS — M47.819 FACET ARTHROPATHY OF SPINE: ICD-10-CM

## 2023-06-01 PROCEDURE — 3017F COLORECTAL CA SCREEN DOC REV: CPT | Performed by: NURSE PRACTITIONER

## 2023-06-01 PROCEDURE — 4004F PT TOBACCO SCREEN RCVD TLK: CPT | Performed by: NURSE PRACTITIONER

## 2023-06-01 PROCEDURE — G8400 PT W/DXA NO RESULTS DOC: HCPCS | Performed by: NURSE PRACTITIONER

## 2023-06-01 PROCEDURE — 1123F ACP DISCUSS/DSCN MKR DOCD: CPT | Performed by: NURSE PRACTITIONER

## 2023-06-01 PROCEDURE — 1090F PRES/ABSN URINE INCON ASSESS: CPT | Performed by: NURSE PRACTITIONER

## 2023-06-01 PROCEDURE — 99214 OFFICE O/P EST MOD 30 MIN: CPT | Performed by: NURSE PRACTITIONER

## 2023-06-01 PROCEDURE — G8427 DOCREV CUR MEDS BY ELIG CLIN: HCPCS | Performed by: NURSE PRACTITIONER

## 2023-06-01 PROCEDURE — G8420 CALC BMI NORM PARAMETERS: HCPCS | Performed by: NURSE PRACTITIONER

## 2023-06-01 RX ORDER — MELOXICAM 15 MG/1
15 TABLET ORAL DAILY PRN
Qty: 90 TABLET | Refills: 3 | Status: SHIPPED | OUTPATIENT
Start: 2023-06-01 | End: 2023-08-30

## 2023-06-01 RX ORDER — TRAMADOL HYDROCHLORIDE 50 MG/1
50 TABLET ORAL EVERY 8 HOURS PRN
Qty: 90 TABLET | Refills: 0 | Status: SHIPPED | OUTPATIENT
Start: 2023-06-01 | End: 2023-07-01

## 2023-06-01 ASSESSMENT — ENCOUNTER SYMPTOMS
COLOR CHANGE: 0
BACK PAIN: 1

## 2023-06-01 NOTE — PROGRESS NOTES
[address] [facility] [address]  Dept: [phone]  Dept Fax: [phone]: [phone]    Visit Date: 6/1/2023    Functionality Assessment/Goals Worksheet     On a scale of 0 (Does not Interfere) to 10 (Completely Interferes)     1. Which number describes how during the past week pain has interfered with       the following:  A. General Activity:  6  B. Mood: 7  C. Walking Ability:  6  D. Normal Work (Includes both work outside the home and housework):  6  E. Relations with Other People:   7  F. Sleep:   8  G. Enjoyment of Life:   8    2. Patient Prefers to Take their Pain Medications:     []  On a regular basis   [x]  Only when necessary    []  Does not take pain medications    3. What are the Patient's Goals/Expectations for Visiting Pain Management?      []  Learn about my pain    []  Receive Medication   []  Physical Therapy     []  Treat Depression   []  Receive Injections    []  Treat Sleep   []  Deal with Anxiety and Stress   []  Treat Opoid Dependence/Addiction   []  Other:

## 2023-08-31 ENCOUNTER — OFFICE VISIT (OUTPATIENT)
Dept: PHYSICAL MEDICINE AND REHAB | Age: 68
End: 2023-08-31
Payer: MEDICARE

## 2023-08-31 VITALS
WEIGHT: 134 LBS | DIASTOLIC BLOOD PRESSURE: 78 MMHG | BODY MASS INDEX: 22.88 KG/M2 | SYSTOLIC BLOOD PRESSURE: 116 MMHG | HEART RATE: 62 BPM | HEIGHT: 64 IN

## 2023-08-31 DIAGNOSIS — M47.819 FACET ARTHROPATHY OF SPINE: ICD-10-CM

## 2023-08-31 DIAGNOSIS — M54.16 LUMBAR RADICULOPATHY: ICD-10-CM

## 2023-08-31 DIAGNOSIS — M48.061 FORAMINAL STENOSIS OF LUMBAR REGION: ICD-10-CM

## 2023-08-31 DIAGNOSIS — G89.4 CHRONIC PAIN SYNDROME: ICD-10-CM

## 2023-08-31 DIAGNOSIS — M62.838 SPASM OF MUSCLE: ICD-10-CM

## 2023-08-31 DIAGNOSIS — M79.7 FIBROMYALGIA: ICD-10-CM

## 2023-08-31 DIAGNOSIS — F11.90 CHRONIC, CONTINUOUS USE OF OPIOIDS: ICD-10-CM

## 2023-08-31 DIAGNOSIS — M47.816 LUMBAR SPONDYLOSIS: Primary | ICD-10-CM

## 2023-08-31 DIAGNOSIS — M96.1 POST LAMINECTOMY SYNDROME: ICD-10-CM

## 2023-08-31 DIAGNOSIS — M46.1 SACROILIAC INFLAMMATION (HCC): ICD-10-CM

## 2023-08-31 PROCEDURE — G8427 DOCREV CUR MEDS BY ELIG CLIN: HCPCS | Performed by: NURSE PRACTITIONER

## 2023-08-31 PROCEDURE — 99214 OFFICE O/P EST MOD 30 MIN: CPT | Performed by: NURSE PRACTITIONER

## 2023-08-31 PROCEDURE — 3017F COLORECTAL CA SCREEN DOC REV: CPT | Performed by: NURSE PRACTITIONER

## 2023-08-31 PROCEDURE — 1090F PRES/ABSN URINE INCON ASSESS: CPT | Performed by: NURSE PRACTITIONER

## 2023-08-31 PROCEDURE — G8400 PT W/DXA NO RESULTS DOC: HCPCS | Performed by: NURSE PRACTITIONER

## 2023-08-31 PROCEDURE — 4004F PT TOBACCO SCREEN RCVD TLK: CPT | Performed by: NURSE PRACTITIONER

## 2023-08-31 PROCEDURE — 1123F ACP DISCUSS/DSCN MKR DOCD: CPT | Performed by: NURSE PRACTITIONER

## 2023-08-31 PROCEDURE — G8420 CALC BMI NORM PARAMETERS: HCPCS | Performed by: NURSE PRACTITIONER

## 2023-08-31 RX ORDER — TRAMADOL HYDROCHLORIDE 50 MG/1
50 TABLET ORAL EVERY 8 HOURS PRN
Qty: 90 TABLET | Refills: 0 | Status: SHIPPED | OUTPATIENT
Start: 2023-08-31 | End: 2023-09-30

## 2023-08-31 ASSESSMENT — ENCOUNTER SYMPTOMS
BACK PAIN: 1
COLOR CHANGE: 0

## 2023-08-31 NOTE — PROGRESS NOTES
[address] [place] [address]  Dept: [phone]  Dept Fax: [phone]: [phone]    Visit Date: 8/31/2023    Functionality Assessment/Goals Worksheet     On a scale of 0 (Does not Interfere) to 10 (Completely Interferes)     1. Which number describes how during the past week pain has interfered with       the following:  A. General Activity:  6  B. Mood: 7  C. Walking Ability:  6  D. Normal Work (Includes both work outside the home and housework):  6  E. Relations with Other People:   7  F. Sleep:   8  G. Enjoyment of Life:   8    2. Patient Prefers to Take their Pain Medications:     []  On a regular basis   [x]  Only when necessary    []  Does not take pain medications    3. What are the Patient's Goals/Expectations for Visiting Pain Management? []  Learn about my pain    []  Receive Medication   []  Physical Therapy     []  Treat Depression   []  Receive Injections    []  Treat Sleep   []  Deal with Anxiety and Stress   []  Treat Opoid Dependence/Addiction   []  Other:    HPI:   Slime Burks is a 70 y.o. female is here today for    Chief Complaint: Lumbar back pain, Thoracic back pain , cervical pain , Hip pain, SI pain, and Fibromyalgia      F/U no new changes. First time great Grandma this week. She gardens and crafts which has caused increased pain. Increased pain due to yard work. She has had L5-S1 lumbar surgery in 2018 with Dr Jorge Ogden, not much relief. She had surgical re eval and he recommended pain injections. She has had TFLESI L5 right  with minimal releif. We have discussed Lumbar Facet MBB L4-5,5-S1 bilateral or SI MBB she declines at this time due to  just having heart stents and medical bills. She continues to take Tramadol  very rarely and Mobic. She has had PT with dry needling ad cupping in past with some relief.  She has been

## 2023-08-31 NOTE — PROGRESS NOTES
Functionality Assessment/Goals Worksheet     On a scale of 0 (Does not Interfere) to 10 (Completely Interferes)     1. Which number describes how during the past week pain has interfered with           the following:  A. General Activity:  6  B. Mood: 7  C. Walking Ability:  4  D. Normal Work (Includes both work outside the home and housework):  4  E. Relations with Other People:   6  F. Sleep:   5  G. Enjoyment of Life:   6    2. Patient Prefers to Take their Pain Medications:     []  On a regular basis   [x]  Only when necessary    []  Does not take pain medications    3. What are the Patient's Goals/Expectations for Visiting Pain Management?      []  Learn about my pain    [x]  Receive Medication   []  Physical Therapy     []  Treat Depression   []  Receive Injections    []  Treat Sleep   []  Deal with Anxiety and Stress   []  Treat Opoid Dependence/Addiction   []  Other:

## 2023-12-07 ENCOUNTER — OFFICE VISIT (OUTPATIENT)
Dept: PHYSICAL MEDICINE AND REHAB | Age: 68
End: 2023-12-07
Payer: MEDICARE

## 2023-12-07 VITALS
DIASTOLIC BLOOD PRESSURE: 62 MMHG | SYSTOLIC BLOOD PRESSURE: 124 MMHG | WEIGHT: 134.04 LBS | HEIGHT: 64 IN | BODY MASS INDEX: 22.88 KG/M2

## 2023-12-07 DIAGNOSIS — M62.838 SPASM OF MUSCLE: ICD-10-CM

## 2023-12-07 DIAGNOSIS — M48.061 FORAMINAL STENOSIS OF LUMBAR REGION: ICD-10-CM

## 2023-12-07 DIAGNOSIS — G89.4 CHRONIC PAIN SYNDROME: ICD-10-CM

## 2023-12-07 DIAGNOSIS — M79.7 FIBROMYALGIA: ICD-10-CM

## 2023-12-07 DIAGNOSIS — M47.816 LUMBAR SPONDYLOSIS: ICD-10-CM

## 2023-12-07 DIAGNOSIS — F11.90 CHRONIC, CONTINUOUS USE OF OPIOIDS: ICD-10-CM

## 2023-12-07 DIAGNOSIS — M46.1 SACROILIAC INFLAMMATION (HCC): ICD-10-CM

## 2023-12-07 DIAGNOSIS — M47.819 FACET ARTHROPATHY OF SPINE: ICD-10-CM

## 2023-12-07 DIAGNOSIS — M96.1 POST LAMINECTOMY SYNDROME: ICD-10-CM

## 2023-12-07 DIAGNOSIS — M54.16 LUMBAR RADICULOPATHY: ICD-10-CM

## 2023-12-07 PROCEDURE — 1090F PRES/ABSN URINE INCON ASSESS: CPT | Performed by: NURSE PRACTITIONER

## 2023-12-07 PROCEDURE — 3017F COLORECTAL CA SCREEN DOC REV: CPT | Performed by: NURSE PRACTITIONER

## 2023-12-07 PROCEDURE — 99214 OFFICE O/P EST MOD 30 MIN: CPT | Performed by: NURSE PRACTITIONER

## 2023-12-07 PROCEDURE — G8400 PT W/DXA NO RESULTS DOC: HCPCS | Performed by: NURSE PRACTITIONER

## 2023-12-07 PROCEDURE — 1123F ACP DISCUSS/DSCN MKR DOCD: CPT | Performed by: NURSE PRACTITIONER

## 2023-12-07 PROCEDURE — G8427 DOCREV CUR MEDS BY ELIG CLIN: HCPCS | Performed by: NURSE PRACTITIONER

## 2023-12-07 PROCEDURE — G8484 FLU IMMUNIZE NO ADMIN: HCPCS | Performed by: NURSE PRACTITIONER

## 2023-12-07 PROCEDURE — 4004F PT TOBACCO SCREEN RCVD TLK: CPT | Performed by: NURSE PRACTITIONER

## 2023-12-07 PROCEDURE — G8420 CALC BMI NORM PARAMETERS: HCPCS | Performed by: NURSE PRACTITIONER

## 2023-12-07 RX ORDER — TRAMADOL HYDROCHLORIDE 50 MG/1
50 TABLET ORAL EVERY 8 HOURS PRN
Qty: 90 TABLET | Refills: 0 | Status: SHIPPED | OUTPATIENT
Start: 2023-12-07 | End: 2024-01-06

## 2023-12-07 RX ORDER — PROPRANOLOL HYDROCHLORIDE 10 MG/1
10 TABLET ORAL 2 TIMES DAILY
COMMUNITY
Start: 2023-12-01

## 2023-12-07 RX ORDER — DULOXETIN HYDROCHLORIDE 30 MG/1
CAPSULE, DELAYED RELEASE ORAL
COMMUNITY
Start: 2023-12-01

## 2023-12-07 ASSESSMENT — ENCOUNTER SYMPTOMS
COLOR CHANGE: 0
BACK PAIN: 1

## 2023-12-07 NOTE — PROGRESS NOTES
any ER visits or new health issues since last visit. Pain scale with out pain medications or at its worst is7/10. Pain scale with pain medications or at its best is 5/10. Last dose of Tramadol  was yesterday  Drug screen reviewed from  8/2023 and was appropriate  Pill count completed  today and WNL: Yes         Prior Injections:  SHe has had TFLESI L5 right  with minimal releif. We have discussed Lumbar Facet MBB L4-5,5-S1 bilateral or SI         Radiology:            The patientis allergic to iodides. Subjective:      Review of Systems   Constitutional:  Positive for activity change and fatigue. Negative for chills, diaphoresis and fever. Eyes:  Positive for visual disturbance. Pending eye Dr  May 2023   Cardiovascular:  Positive for leg swelling (right leg). Negative for chest pain and palpitations. Musculoskeletal:  Positive for arthralgias, back pain, gait problem, myalgias and neck pain. Negative for neck stiffness. Skin:  Negative for color change, rash and wound. Allergic/Immunologic: Positive for environmental allergies. Negative for food allergies. Neurological:  Positive for numbness and headaches. Negative for dizziness, seizures and light-headedness. Hematological:  Does not bruise/bleed easily. Psychiatric/Behavioral:  Positive for sleep disturbance. The patient is not nervous/anxious. Objective:     Vitals:    12/07/23 0754   BP: 124/62   Site: Left Upper Arm   Position: Sitting   Cuff Size: Large Adult   Weight: 60.8 kg (134 lb 0.6 oz)   Height: 1.626 m (5' 4.02\")       Physical Exam  Vitals reviewed. Constitutional:       General: She is not in acute distress. Appearance: She is well-developed. She is not diaphoretic. HENT:      Head: Normocephalic and atraumatic. Not macrocephalic and not microcephalic. Right Ear: External ear normal.      Left Ear: External ear normal.   Eyes:      General:         Right eye: No discharge.          Left eye: No

## 2024-03-07 ENCOUNTER — OFFICE VISIT (OUTPATIENT)
Dept: PHYSICAL MEDICINE AND REHAB | Age: 69
End: 2024-03-07
Payer: MEDICARE

## 2024-03-07 VITALS
HEART RATE: 70 BPM | HEIGHT: 64 IN | BODY MASS INDEX: 23.05 KG/M2 | DIASTOLIC BLOOD PRESSURE: 70 MMHG | WEIGHT: 135 LBS | SYSTOLIC BLOOD PRESSURE: 130 MMHG

## 2024-03-07 DIAGNOSIS — M79.7 FIBROMYALGIA: ICD-10-CM

## 2024-03-07 DIAGNOSIS — M96.1 POST LAMINECTOMY SYNDROME: ICD-10-CM

## 2024-03-07 DIAGNOSIS — G89.4 CHRONIC PAIN SYNDROME: ICD-10-CM

## 2024-03-07 DIAGNOSIS — M54.16 LUMBAR RADICULOPATHY: ICD-10-CM

## 2024-03-07 DIAGNOSIS — M48.061 FORAMINAL STENOSIS OF LUMBAR REGION: ICD-10-CM

## 2024-03-07 DIAGNOSIS — M46.1 SACROILIAC INFLAMMATION (HCC): ICD-10-CM

## 2024-03-07 DIAGNOSIS — M47.819 FACET ARTHROPATHY OF SPINE: ICD-10-CM

## 2024-03-07 DIAGNOSIS — M47.816 LUMBAR SPONDYLOSIS: Primary | ICD-10-CM

## 2024-03-07 PROCEDURE — 3017F COLORECTAL CA SCREEN DOC REV: CPT | Performed by: NURSE PRACTITIONER

## 2024-03-07 PROCEDURE — 1123F ACP DISCUSS/DSCN MKR DOCD: CPT | Performed by: NURSE PRACTITIONER

## 2024-03-07 PROCEDURE — 99214 OFFICE O/P EST MOD 30 MIN: CPT | Performed by: NURSE PRACTITIONER

## 2024-03-07 PROCEDURE — G8427 DOCREV CUR MEDS BY ELIG CLIN: HCPCS | Performed by: NURSE PRACTITIONER

## 2024-03-07 PROCEDURE — G8420 CALC BMI NORM PARAMETERS: HCPCS | Performed by: NURSE PRACTITIONER

## 2024-03-07 PROCEDURE — G8400 PT W/DXA NO RESULTS DOC: HCPCS | Performed by: NURSE PRACTITIONER

## 2024-03-07 PROCEDURE — 1090F PRES/ABSN URINE INCON ASSESS: CPT | Performed by: NURSE PRACTITIONER

## 2024-03-07 PROCEDURE — G8484 FLU IMMUNIZE NO ADMIN: HCPCS | Performed by: NURSE PRACTITIONER

## 2024-03-07 PROCEDURE — 4004F PT TOBACCO SCREEN RCVD TLK: CPT | Performed by: NURSE PRACTITIONER

## 2024-03-07 RX ORDER — TRAMADOL HYDROCHLORIDE 50 MG/1
50 TABLET ORAL EVERY 8 HOURS PRN
Qty: 90 TABLET | Refills: 0 | Status: SHIPPED | OUTPATIENT
Start: 2024-03-07 | End: 2024-04-06

## 2024-03-07 RX ORDER — MELOXICAM 15 MG/1
15 TABLET ORAL DAILY PRN
Qty: 90 TABLET | Refills: 0 | Status: SHIPPED | OUTPATIENT
Start: 2024-03-07 | End: 2024-06-05

## 2024-03-07 ASSESSMENT — ENCOUNTER SYMPTOMS
COLOR CHANGE: 0
BACK PAIN: 1

## 2024-03-07 NOTE — PROGRESS NOTES
Negative for dizziness, seizures and light-headedness.   Hematological:  Does not bruise/bleed easily.   Psychiatric/Behavioral:  Positive for sleep disturbance. The patient is not nervous/anxious.        Objective:     Vitals:    03/07/24 0856   BP: 130/70   Site: Left Upper Arm   Position: Sitting   Cuff Size: Medium Adult   Pulse: 70   Weight: 61.2 kg (135 lb)   Height: 1.626 m (5' 4.02\")       Physical Exam  Vitals reviewed.   Constitutional:       General: She is not in acute distress.     Appearance: She is well-developed. She is not diaphoretic.   HENT:      Head: Normocephalic and atraumatic. Not macrocephalic and not microcephalic.      Right Ear: External ear normal.      Left Ear: External ear normal.   Eyes:      General:         Right eye: No discharge.         Left eye: No discharge.      Conjunctiva/sclera: Conjunctivae normal.   Neck:      Trachea: No tracheal deviation.   Pulmonary:      Effort: Pulmonary effort is normal. No respiratory distress.   Musculoskeletal:         General: Tenderness present.      Thoracic back: Spasms, tenderness and bony tenderness present. Decreased range of motion.      Lumbar back: Spasms, tenderness and bony tenderness present. Decreased range of motion.        Back:       Right hip: Bony tenderness present.      Left hip: Bony tenderness present.      Right knee: Bony tenderness present. Tenderness present.      Left knee: Bony tenderness and crepitus present. Decreased range of motion. Tenderness present over the medial joint line and lateral joint line.        Legs:    Skin:     General: Skin is warm and dry.      Coloration: Skin is not pale.      Findings: No rash.   Neurological:      Mental Status: She is alert and oriented to person, place, and time.      Cranial Nerves: No cranial nerve deficit.   Psychiatric:         Attention and Perception: Attention and perception normal. She is attentive.         Mood and Affect: Mood and affect normal.         Speech:

## 2024-06-06 ENCOUNTER — OFFICE VISIT (OUTPATIENT)
Dept: PHYSICAL MEDICINE AND REHAB | Age: 69
End: 2024-06-06
Payer: MEDICARE

## 2024-06-06 VITALS
SYSTOLIC BLOOD PRESSURE: 126 MMHG | HEIGHT: 64 IN | DIASTOLIC BLOOD PRESSURE: 64 MMHG | WEIGHT: 134.92 LBS | BODY MASS INDEX: 23.03 KG/M2

## 2024-06-06 DIAGNOSIS — M96.1 POST LAMINECTOMY SYNDROME: ICD-10-CM

## 2024-06-06 DIAGNOSIS — M79.7 FIBROMYALGIA: ICD-10-CM

## 2024-06-06 DIAGNOSIS — M46.1 SACROILIAC INFLAMMATION (HCC): ICD-10-CM

## 2024-06-06 DIAGNOSIS — M48.061 FORAMINAL STENOSIS OF LUMBAR REGION: ICD-10-CM

## 2024-06-06 DIAGNOSIS — M19.011 PRIMARY OSTEOARTHRITIS OF RIGHT SHOULDER: ICD-10-CM

## 2024-06-06 DIAGNOSIS — G62.9 NEUROPATHY: ICD-10-CM

## 2024-06-06 DIAGNOSIS — M85.621 OTHER CYST OF BONE, RIGHT UPPER ARM: ICD-10-CM

## 2024-06-06 DIAGNOSIS — M54.12 CERVICAL RADICULOPATHY: ICD-10-CM

## 2024-06-06 DIAGNOSIS — G89.4 CHRONIC PAIN SYNDROME: ICD-10-CM

## 2024-06-06 DIAGNOSIS — M47.816 LUMBAR SPONDYLOSIS: Primary | ICD-10-CM

## 2024-06-06 DIAGNOSIS — D17.21 LIPOMA OF RIGHT UPPER EXTREMITY: ICD-10-CM

## 2024-06-06 DIAGNOSIS — M54.16 LUMBAR RADICULOPATHY: ICD-10-CM

## 2024-06-06 PROCEDURE — 3017F COLORECTAL CA SCREEN DOC REV: CPT | Performed by: NURSE PRACTITIONER

## 2024-06-06 PROCEDURE — 1090F PRES/ABSN URINE INCON ASSESS: CPT | Performed by: NURSE PRACTITIONER

## 2024-06-06 PROCEDURE — G8400 PT W/DXA NO RESULTS DOC: HCPCS | Performed by: NURSE PRACTITIONER

## 2024-06-06 PROCEDURE — 4004F PT TOBACCO SCREEN RCVD TLK: CPT | Performed by: NURSE PRACTITIONER

## 2024-06-06 PROCEDURE — G8427 DOCREV CUR MEDS BY ELIG CLIN: HCPCS | Performed by: NURSE PRACTITIONER

## 2024-06-06 PROCEDURE — G8420 CALC BMI NORM PARAMETERS: HCPCS | Performed by: NURSE PRACTITIONER

## 2024-06-06 PROCEDURE — 99215 OFFICE O/P EST HI 40 MIN: CPT | Performed by: NURSE PRACTITIONER

## 2024-06-06 PROCEDURE — 1123F ACP DISCUSS/DSCN MKR DOCD: CPT | Performed by: NURSE PRACTITIONER

## 2024-06-06 RX ORDER — TRAMADOL HYDROCHLORIDE 50 MG/1
50 TABLET ORAL EVERY 8 HOURS PRN
Qty: 90 TABLET | Refills: 0 | Status: SHIPPED | OUTPATIENT
Start: 2024-06-06 | End: 2024-07-06

## 2024-06-06 RX ORDER — MELOXICAM 15 MG/1
15 TABLET ORAL DAILY PRN
Qty: 90 TABLET | Refills: 0 | Status: SHIPPED | OUTPATIENT
Start: 2024-06-06 | End: 2024-09-04

## 2024-06-06 ASSESSMENT — ENCOUNTER SYMPTOMS
COLOR CHANGE: 0
BACK PAIN: 1

## 2024-06-06 NOTE — PROGRESS NOTES
Functionality Assessment/Goals Worksheet     On a scale of 0 (Does not Interfere) to 10 (Completely Interferes)     1.  Which number describes how during the past week pain has interfered with           the following:  A.  General Activity:  6  B.  Mood: 8  C.  Walking Ability:  4  D.  Normal Work (Includes both work outside the home and housework):  5  E.  Relations with Other People:   7  F.  Sleep:   8  G.  Enjoyment of Life:   7    2.  Patient Prefers to Take their Pain Medications:     []  On a regular basis   [x]  Only when necessary    []  Does not take pain medications    3.  What are the Patient's Goals/Expectations for Visiting Pain Management?     []  Learn about my pain    [x]  Receive Medication   []  Physical Therapy     []  Treat Depression   []  Receive Injections    []  Treat Sleep   []  Deal with Anxiety and Stress   []  Treat Opoid Dependence/Addiction   []  Other:     HPI:   Graciela Fregoso is a 68 y.o. female is here today for    Chief Complaint: Lumbar back pain, Thoracic back pain , cervical pain , Hip pain, SI pain, and Fibromyalgia      F/U   had some right shoulder pain was given steroids  per Dr Galeas.   No other new health issues.   She has been  having BUE  numbness tingling. She injured shoulder a month ago lifting hauling river rock and mulching.  She has a large cyst like knot or lipoma or even possible tear on shoulder region. She has full  ROM  no decreased strength.     She is here with left knee brace on following Dr Galeas. Pending LTKR has not been scheduled as of yet.   She injured her knee last year gardening.  She had steroid knee injection Jan 2024  mild  minimal relief. Discussed we can offer knee steroid injections also.  Her knee pain is exacerbating  her back pain.   Increased  lumbar knee pain due to yard work and cold weather gardening crafting.      She has had L5-S1 lumbar surgery in 2018 with Dr Batres, not much relief. She had surgical re eval and he recommended

## 2024-06-06 NOTE — PROGRESS NOTES
[facility] PROFESSIONAL SERVS  [facility] NEUROSCIENCE AND REHABILITATION [address]  [facility] [address]  Dept: [phone]  Dept Fax: [phone]  Loc: [phone]    Visit Date: 6/6/2024    Functionality Assessment/Goals Worksheet     On a scale of 0 (Does not Interfere) to 10 (Completely Interferes)     1.  Which number describes how during the past week pain has interfered with       the following:  A.  General Activity:  7  B.  Mood: 6  C.  Walking Ability:  6  D.  Normal Work (Includes both work outside the home and housework):  7  E.  Relations with Other People:   6  F.  Sleep:   7  G.  Enjoyment of Life:   6    2.  Patient Prefers to Take their Pain Medications:     []  On a regular basis   [x]  Only when necessary    []  Does not take pain medications    3.  What are the Patient's Goals/Expectations for Visiting Pain Management?     []  Learn about my pain    [x]  Receive Medication   []  Physical Therapy     []  Treat Depression   []  Receive Injections    []  Treat Sleep   []  Deal with Anxiety and Stress   []  Treat Opoid Dependence/Addiction   []  Other:

## 2024-06-06 NOTE — PATIENT INSTRUCTIONS
Multidisciplinary Pain Management:   In the presence of complex, chronic, and multi-factorial pain, the importance of a multidisciplinary approach to pain management in the patient’s management regimen was emphasized and discussed in great detail.   PHYSICAL THERAPY: Patient is advised to see a physical therapist for gentle stretching exercises and conditioning exercises for management of pain.   PSYCHOLOGY: Patient is advised to see a clinical pain psychologist, for the psychosocial aspect of pain care through coping skills, relaxation strategies, cognitive group therapy etc.   OBESITY: Patient is advised to seek nutrition consult to help in managing their weight to decrease its impact on pain.   The patient was also advised to continue physical therapy and stretching exercises at home and cognitive behavioral and/or group therapy.    
Admission

## 2024-06-19 ENCOUNTER — TELEPHONE (OUTPATIENT)
Dept: PHYSICAL MEDICINE AND REHAB | Age: 69
End: 2024-06-19

## 2024-06-19 NOTE — TELEPHONE ENCOUNTER
Pt called and LVM about getting scheduled for her US of shoulder. Called [facility] and scheduled US for July 3 at 3pm. To be their at 2:45pm and will also do shoulder xray at same time. To call back and verify will work for her and to reschedule  NP aptm on 6/24 till after testing done

## 2024-07-03 ENCOUNTER — HOSPITAL ENCOUNTER (OUTPATIENT)
Dept: GENERAL RADIOLOGY | Age: 69
Discharge: HOME | End: 2024-07-03
Payer: MEDICARE

## 2024-07-03 ENCOUNTER — HOSPITAL ENCOUNTER (OUTPATIENT)
Age: 69
Discharge: HOME | End: 2024-07-03
Payer: MEDICARE

## 2024-07-03 ENCOUNTER — HOSPITAL ENCOUNTER (OUTPATIENT)
Dept: ULTRASOUND IMAGING | Age: 69
Discharge: HOME | End: 2024-07-03
Payer: MEDICARE

## 2024-07-03 DIAGNOSIS — D17.21 LIPOMA OF RIGHT UPPER EXTREMITY: ICD-10-CM

## 2024-07-03 DIAGNOSIS — M85.621 OTHER CYST OF BONE, RIGHT UPPER ARM: ICD-10-CM

## 2024-07-03 DIAGNOSIS — M19.011 PRIMARY OSTEOARTHRITIS OF RIGHT SHOULDER: ICD-10-CM

## 2024-07-03 PROCEDURE — 76882 US LMTD JT/FCL EVL NVASC XTR: CPT

## 2024-07-03 PROCEDURE — 73030 X-RAY EXAM OF SHOULDER: CPT

## 2024-07-18 ENCOUNTER — OFFICE VISIT (OUTPATIENT)
Dept: PHYSICAL MEDICINE AND REHAB | Age: 69
End: 2024-07-18
Payer: MEDICARE

## 2024-07-18 VITALS — SYSTOLIC BLOOD PRESSURE: 120 MMHG | DIASTOLIC BLOOD PRESSURE: 72 MMHG

## 2024-07-18 DIAGNOSIS — G62.9 NEUROPATHY: ICD-10-CM

## 2024-07-18 DIAGNOSIS — M19.011 PRIMARY OSTEOARTHRITIS OF RIGHT SHOULDER: ICD-10-CM

## 2024-07-18 DIAGNOSIS — M46.1 SACROILIAC INFLAMMATION (HCC): ICD-10-CM

## 2024-07-18 DIAGNOSIS — M54.16 LUMBAR RADICULOPATHY: ICD-10-CM

## 2024-07-18 DIAGNOSIS — M96.1 POST LAMINECTOMY SYNDROME: ICD-10-CM

## 2024-07-18 DIAGNOSIS — M47.816 LUMBAR SPONDYLOSIS: Primary | ICD-10-CM

## 2024-07-18 DIAGNOSIS — G89.4 CHRONIC PAIN SYNDROME: ICD-10-CM

## 2024-07-18 DIAGNOSIS — D17.21 LIPOMA OF RIGHT UPPER EXTREMITY: ICD-10-CM

## 2024-07-18 DIAGNOSIS — M47.812 CERVICAL SPONDYLOSIS: ICD-10-CM

## 2024-07-18 DIAGNOSIS — M54.12 CERVICAL RADICULOPATHY: ICD-10-CM

## 2024-07-18 DIAGNOSIS — M79.7 FIBROMYALGIA: ICD-10-CM

## 2024-07-18 PROCEDURE — 99214 OFFICE O/P EST MOD 30 MIN: CPT | Performed by: NURSE PRACTITIONER

## 2024-07-18 PROCEDURE — 1090F PRES/ABSN URINE INCON ASSESS: CPT | Performed by: NURSE PRACTITIONER

## 2024-07-18 PROCEDURE — G8400 PT W/DXA NO RESULTS DOC: HCPCS | Performed by: NURSE PRACTITIONER

## 2024-07-18 PROCEDURE — G8420 CALC BMI NORM PARAMETERS: HCPCS | Performed by: NURSE PRACTITIONER

## 2024-07-18 PROCEDURE — 4004F PT TOBACCO SCREEN RCVD TLK: CPT | Performed by: NURSE PRACTITIONER

## 2024-07-18 PROCEDURE — 3017F COLORECTAL CA SCREEN DOC REV: CPT | Performed by: NURSE PRACTITIONER

## 2024-07-18 PROCEDURE — G8427 DOCREV CUR MEDS BY ELIG CLIN: HCPCS | Performed by: NURSE PRACTITIONER

## 2024-07-18 PROCEDURE — 1123F ACP DISCUSS/DSCN MKR DOCD: CPT | Performed by: NURSE PRACTITIONER

## 2024-07-18 ASSESSMENT — ENCOUNTER SYMPTOMS
COLOR CHANGE: 0
BACK PAIN: 1

## 2024-07-18 NOTE — PROGRESS NOTES
Functionality Assessment/Goals Worksheet     On a scale of 0 (Does not Interfere) to 10 (Completely Interferes)     1.  Which number describes how during the past week pain has interfered with           the following:  A.  General Activity:  7  B.  Mood: 7  C.  Walking Ability:  7  D.  Normal Work (Includes both work outside the home and housework):  7  E.  Relations with Other People:   6  F.  Sleep:   8  G.  Enjoyment of Life:   7    2.  Patient Prefers to Take their Pain Medications:     []  On a regular basis   []  Only when necessary    []  Does not take pain medications    3.  What are the Patient's Goals/Expectations for Visiting Pain Management?     []  Learn about my pain    []  Receive Medication   []  Physical Therapy     []  Treat Depression   []  Receive Injections    []  Treat Sleep   []  Deal with Anxiety and Stress   []  Treat Opoid Dependence/Addiction   []  Other:     HPI:   Graciela Fregoso is a 69 y.o. female is here today for    Chief Complaint: Lumbar back pain, Thoracic back pain , cervical pain , Hip pain, SI pain, and Fibromyalgia      F/U EMG BUE  right shoulder XR and US RUE  states US was painful aggravated nerves.   EMG not completed a of yet August 1,2024 scheduled.        FINDINGS: No bone, joint, or soft tissue abnormality is seen.     IMPRESSION:  Normal  shoulder.           FINDINGS: There is suggestion of a poorly defined mildly hypoechoic structure  measuring 2.5 x 3.3 x 0.8 cm in the subcutaneous tissues at the site of patient  identified area of concern.     IMPRESSION:     Mildly hypoechoic subcutaneous structure which may correspond to known lipoma.       She has been  having BUE  numbness tingling. She injured shoulder  lifting hauling river rock and mulching.  She has a large cyst like knot or lipoma or even possible tear on shoulder region. She has full  ROM  no decreased strength.         She is here with left knee brace on following Dr Galeas. Pending LTKR has not been

## 2024-07-23 ENCOUNTER — HOSPITAL ENCOUNTER (OUTPATIENT)
Dept: GENERAL RADIOLOGY | Age: 69
Discharge: HOME | End: 2024-07-23
Payer: MEDICARE

## 2024-07-23 ENCOUNTER — HOSPITAL ENCOUNTER (OUTPATIENT)
Age: 69
Discharge: HOME | End: 2024-07-23
Payer: MEDICARE

## 2024-07-23 DIAGNOSIS — M47.812 CERVICAL SPONDYLOSIS: ICD-10-CM

## 2024-07-23 PROCEDURE — 72040 X-RAY EXAM NECK SPINE 2-3 VW: CPT

## 2024-07-31 ENCOUNTER — HOSPITAL ENCOUNTER (EMERGENCY)
Age: 69
Discharge: HOME | End: 2024-08-01
Attending: EMERGENCY MEDICINE
Payer: MEDICARE

## 2024-07-31 ENCOUNTER — APPOINTMENT (OUTPATIENT)
Dept: GENERAL RADIOLOGY | Age: 69
End: 2024-07-31
Payer: MEDICARE

## 2024-07-31 DIAGNOSIS — S90.851A SPLINTER OF RIGHT FOOT, INITIAL ENCOUNTER: Primary | ICD-10-CM

## 2024-07-31 PROCEDURE — 96375 TX/PRO/DX INJ NEW DRUG ADDON: CPT

## 2024-07-31 PROCEDURE — 96374 THER/PROPH/DIAG INJ IV PUSH: CPT

## 2024-07-31 PROCEDURE — 73630 X-RAY EXAM OF FOOT: CPT

## 2024-07-31 PROCEDURE — 99284 EMERGENCY DEPT VISIT MOD MDM: CPT

## 2024-07-31 ASSESSMENT — PAIN DESCRIPTION - LOCATION: LOCATION: FOOT

## 2024-07-31 ASSESSMENT — PAIN SCALES - GENERAL: PAINLEVEL_OUTOF10: 7

## 2024-07-31 ASSESSMENT — PAIN DESCRIPTION - DESCRIPTORS: DESCRIPTORS: ACHING

## 2024-07-31 ASSESSMENT — PAIN - FUNCTIONAL ASSESSMENT: PAIN_FUNCTIONAL_ASSESSMENT: 0-10

## 2024-08-01 VITALS
BODY MASS INDEX: 22.53 KG/M2 | DIASTOLIC BLOOD PRESSURE: 75 MMHG | HEART RATE: 81 BPM | TEMPERATURE: 98.2 F | SYSTOLIC BLOOD PRESSURE: 115 MMHG | WEIGHT: 132 LBS | RESPIRATION RATE: 18 BRPM | HEIGHT: 64 IN | OXYGEN SATURATION: 98 %

## 2024-08-01 PROCEDURE — 6370000000 HC RX 637 (ALT 250 FOR IP): Performed by: EMERGENCY MEDICINE

## 2024-08-01 PROCEDURE — 2500000003 HC RX 250 WO HCPCS

## 2024-08-01 PROCEDURE — 90471 IMMUNIZATION ADMIN: CPT | Performed by: EMERGENCY MEDICINE

## 2024-08-01 PROCEDURE — 96374 THER/PROPH/DIAG INJ IV PUSH: CPT

## 2024-08-01 PROCEDURE — 6360000002 HC RX W HCPCS: Performed by: EMERGENCY MEDICINE

## 2024-08-01 PROCEDURE — 96375 TX/PRO/DX INJ NEW DRUG ADDON: CPT

## 2024-08-01 PROCEDURE — 90715 TDAP VACCINE 7 YRS/> IM: CPT | Performed by: EMERGENCY MEDICINE

## 2024-08-01 PROCEDURE — 2580000003 HC RX 258: Performed by: EMERGENCY MEDICINE

## 2024-08-01 RX ORDER — HYDROCODONE BITARTRATE AND ACETAMINOPHEN 5; 325 MG/1; MG/1
1 TABLET ORAL EVERY 4 HOURS PRN
Qty: 18 TABLET | Refills: 0 | Status: SHIPPED | OUTPATIENT
Start: 2024-08-01 | End: 2024-08-04

## 2024-08-01 RX ORDER — HYDROCODONE BITARTRATE AND ACETAMINOPHEN 5; 325 MG/1; MG/1
1 TABLET ORAL ONCE
Status: COMPLETED | OUTPATIENT
Start: 2024-08-01 | End: 2024-08-01

## 2024-08-01 RX ORDER — MORPHINE SULFATE 4 MG/ML
4 INJECTION, SOLUTION INTRAMUSCULAR; INTRAVENOUS ONCE
Status: COMPLETED | OUTPATIENT
Start: 2024-08-01 | End: 2024-08-01

## 2024-08-01 RX ORDER — CEPHALEXIN 500 MG/1
500 CAPSULE ORAL 4 TIMES DAILY
Qty: 28 CAPSULE | Refills: 0 | Status: SHIPPED | OUTPATIENT
Start: 2024-08-01 | End: 2024-08-08

## 2024-08-01 RX ADMIN — LIDOCAINE HYDROCHLORIDE 15 ML: 10 INJECTION, SOLUTION EPIDURAL; INFILTRATION; INTRACAUDAL; PERINEURAL at 01:57

## 2024-08-01 RX ADMIN — TETANUS TOXOID, REDUCED DIPHTHERIA TOXOID AND ACELLULAR PERTUSSIS VACCINE, ADSORBED 0.5 ML: 5; 2.5; 8; 8; 2.5 SUSPENSION INTRAMUSCULAR at 00:10

## 2024-08-01 RX ADMIN — HYDROCODONE BITARTRATE AND ACETAMINOPHEN 1 TABLET: 5; 325 TABLET ORAL at 01:57

## 2024-08-01 RX ADMIN — MORPHINE SULFATE 4 MG: 4 INJECTION, SOLUTION INTRAMUSCULAR; INTRAVENOUS at 00:08

## 2024-08-01 RX ADMIN — WATER 2000 MG: 1 INJECTION INTRAMUSCULAR; INTRAVENOUS; SUBCUTANEOUS at 00:13

## 2024-08-01 NOTE — CONSULTS
extended release capsule       propranolol (INDERAL) 10 MG tablet Take 1 tablet by mouth 2 times daily      rosuvastatin (CRESTOR) 5 MG tablet Take 1 tablet by mouth daily      levothyroxine (SYNTHROID) 50 MCG tablet Take 1 tablet by mouth Daily      Cholecalciferol (VITAMIN D3) 50 MCG (2000 UT) CAPS Take by mouth daily      acetaminophen (TYLENOL) 325 MG tablet Take 2 tablets by mouth every 6 hours as needed for Pain      ibuprofen (ADVIL) 200 MG tablet Take 1 tablet by mouth every 6 hours as needed for Pain      rOPINIRole (REQUIP) 1 MG tablet Take 1 tablet by mouth nightly         Review of Systems  ROS as noted in HPI      Objective      /75   Pulse 81   Temp 98.2 °F (36.8 °C) (Oral)   Resp 18   Ht 1.626 m (5' 4\")   Wt 59.9 kg (132 lb)   SpO2 98%   BMI 22.66 kg/m²     Wt Readings from Last 1 Encounters:   07/31/24 59.9 kg (132 lb)       Physical exam (lower extremity focused)  Vascular: Dorsalis pedis and posterior tibial pulses are palpable bilaterally. Skin temperature is warm to warm from proximal tibial tuberosity to distal digits. CFT brisk to exposed digits. Edema absent. Hair growth absent.    Dermatologic: Foreign body visualized at the second interspace of the plantar R foot at the level of the metatarsal heads. Sanguinous drainage noted. Foreign body appears to go proximal to the level of the metatarsal base. The foreign body appears to made of wood material. Can be palpated along the plantar foot.     Neurovascular: Light touch and gross sensation intact    Musculoskeletal: Muscle strength and ROM deferred due to acute pain. Pain with palpation of to plantar right foot. Foot and ankle in grossly rectus alignment.              Labs     BMP:   Lab Results   Component Value Date/Time     12/11/2017 12:33 PM    K 4.4 12/11/2017 12:33 PM     12/11/2017 12:33 PM    CO2 22 12/11/2017 12:33 PM    BUN 18 12/11/2017 12:33 PM    CREATININE 0.7 12/11/2017 12:33 PM     CBC:   Lab Results

## 2024-08-01 NOTE — ED NOTES
Patient presents to ED with chief complaint of large splinter (3 inches) in right foot. Patient was working in the garden and when she went back to the house, she accidentally got a splinter in her foot from her ramp. Pain rated at 7/10. Patient resting in bed. Respirations easy and unlabored. No distress noted. Call light within reach.

## 2024-08-01 NOTE — ED PROVIDER NOTES
and [facility]    DATA REVIEWED(none if left blank)    Code Status:  Reviewed with patient and/or family as Full code    External Documentation Reviewed: Relevant record in care everywhere is reviewed (If there is any).    Previous relevant patient encounter documents & history available on EMR was reviewed: Yes (If there is any)    See Formal Diagnostic Results above for the lab and radiology tests and orders.    Shared Decision-Making: ED workups, treatment plan and dispositions are discussed with the patient/family, questions answered     FINAL IMPRESSION AND DISPOSITION     1. Splinter of right foot, initial encounter        DISPOSITION Discharge - Pending Orders Complete 08/01/2024 01:40:35 AM      OUTPATIENT FOLLOW UP THE PATIENT:  Jane, Jax T, DPM  [address] [place] [address] [phone]    On 8/1/2024  Go to walk in clinic at 8 am.      DISCHARGE MEDICATIONS:(None if blank)  New Prescriptions    CEPHALEXIN (KEFLEX) 500 MG CAPSULE    Take 1 capsule by mouth 4 times daily for 7 days    HYDROCODONE-ACETAMINOPHEN (NORCO) 5-325 MG PER TABLET    Take 1 tablet by mouth every 4 hours as needed for Pain for up to 3 days. Intended supply: 3 days. Take lowest dose possible to manage pain Max Daily Amount: 6 tablets       MD Ginger Torres, MD [place]  08/01/24 [id number]

## 2024-10-08 ENCOUNTER — HOSPITAL ENCOUNTER (OUTPATIENT)
Age: 69
Discharge: HOME | End: 2024-10-08
Payer: MEDICARE

## 2024-10-08 ENCOUNTER — HOSPITAL ENCOUNTER (OUTPATIENT)
Dept: GENERAL RADIOLOGY | Age: 69
Discharge: HOME | End: 2024-10-08
Payer: MEDICARE

## 2024-10-08 DIAGNOSIS — Z01.818 PRE-OP EVALUATION: ICD-10-CM

## 2024-10-08 PROCEDURE — 71046 X-RAY EXAM CHEST 2 VIEWS: CPT

## 2024-10-14 ENCOUNTER — OFFICE VISIT (OUTPATIENT)
Dept: INTERNAL MEDICINE CLINIC | Age: 69
End: 2024-10-14
Payer: MEDICARE

## 2024-10-14 VITALS
HEART RATE: 66 BPM | HEIGHT: 64 IN | OXYGEN SATURATION: 98 % | SYSTOLIC BLOOD PRESSURE: 124 MMHG | WEIGHT: 132.2 LBS | DIASTOLIC BLOOD PRESSURE: 78 MMHG | BODY MASS INDEX: 22.57 KG/M2

## 2024-10-14 DIAGNOSIS — M75.101 NONTRAUMATIC TEAR OF RIGHT ROTATOR CUFF, UNSPECIFIED TEAR EXTENT: Primary | ICD-10-CM

## 2024-10-14 PROCEDURE — 1123F ACP DISCUSS/DSCN MKR DOCD: CPT

## 2024-10-14 PROCEDURE — G8484 FLU IMMUNIZE NO ADMIN: HCPCS

## 2024-10-14 PROCEDURE — 4004F PT TOBACCO SCREEN RCVD TLK: CPT

## 2024-10-14 PROCEDURE — 3017F COLORECTAL CA SCREEN DOC REV: CPT

## 2024-10-14 PROCEDURE — G8427 DOCREV CUR MEDS BY ELIG CLIN: HCPCS

## 2024-10-14 PROCEDURE — G8420 CALC BMI NORM PARAMETERS: HCPCS

## 2024-10-14 PROCEDURE — 1090F PRES/ABSN URINE INCON ASSESS: CPT

## 2024-10-14 PROCEDURE — 99203 OFFICE O/P NEW LOW 30 MIN: CPT

## 2024-10-14 PROCEDURE — G8400 PT W/DXA NO RESULTS DOC: HCPCS

## 2024-10-14 RX ORDER — CHOLECALCIFEROL (VITAMIN D3) 1250 MCG
50000 CAPSULE ORAL WEEKLY
COMMUNITY

## 2024-10-14 RX ORDER — THYROID 15 MG/1
15 TABLET ORAL DAILY
COMMUNITY

## 2024-10-14 RX ORDER — THYROID 30 MG/1
30 TABLET ORAL DAILY
COMMUNITY

## 2024-10-14 RX ORDER — TRAMADOL HYDROCHLORIDE 50 MG/1
50 TABLET ORAL EVERY 8 HOURS PRN
COMMUNITY

## 2024-10-14 SDOH — ECONOMIC STABILITY: FOOD INSECURITY: WITHIN THE PAST 12 MONTHS, THE FOOD YOU BOUGHT JUST DIDN'T LAST AND YOU DIDN'T HAVE MONEY TO GET MORE.: NEVER TRUE

## 2024-10-14 SDOH — ECONOMIC STABILITY: FOOD INSECURITY: WITHIN THE PAST 12 MONTHS, YOU WORRIED THAT YOUR FOOD WOULD RUN OUT BEFORE YOU GOT MONEY TO BUY MORE.: NEVER TRUE

## 2024-10-14 SDOH — ECONOMIC STABILITY: INCOME INSECURITY: HOW HARD IS IT FOR YOU TO PAY FOR THE VERY BASICS LIKE FOOD, HOUSING, MEDICAL CARE, AND HEATING?: SOMEWHAT HARD

## 2024-10-14 ASSESSMENT — PATIENT HEALTH QUESTIONNAIRE - PHQ9
1. LITTLE INTEREST OR PLEASURE IN DOING THINGS: NOT AT ALL
SUM OF ALL RESPONSES TO PHQ9 QUESTIONS 1 & 2: 0
SUM OF ALL RESPONSES TO PHQ QUESTIONS 1-9: 0
SUM OF ALL RESPONSES TO PHQ QUESTIONS 1-9: 0
2. FEELING DOWN, DEPRESSED OR HOPELESS: NOT AT ALL
SUM OF ALL RESPONSES TO PHQ QUESTIONS 1-9: 0
SUM OF ALL RESPONSES TO PHQ QUESTIONS 1-9: 0

## 2024-10-14 NOTE — PROGRESS NOTES
1955    Chief Complaint   Patient presents with    Pre-op Exam     Rt shoulder arthroscopy with debridement, rotator cuff repair, open edwin acrimoplasty and possible bicep tendonesis-dr narvaez-[facility]-10/23/24       Pt is a 69 y.o. female who presents for a preoperative medical consultation at the request of Dr. Narvaez prior to right shoulder arthroscopy rotator cuff repair.  Pt complains of right shoulder pain which has been ongoing since this spring. Pain is improved with keeping it still.  Pain is worsened by picking up objects and lifting them up.  Pt has failed conservative measures such as steroid injections, Synvisc injections, physical therapy, therefore she wishes to proceed with surgical intervention.     Reactions to anesthesia: none    History of excessive bleeding: none    History of blood clots: none    History of blood transfusions: none      Reactions to blood transfusion: none     Past Medical History:   Diagnosis Date    Fibromyalgia     Osteoporosis     Restless leg syndrome        Past Surgical History:   Procedure Laterality Date    BACK SURGERY  2018    plates and screws to lower back    HYSTERECTOMY (CERVIX STATUS UNKNOWN)      KNEE ARTHROSCOPY Bilateral     PAIN MANAGEMENT PROCEDURE Right 01/04/2021    TFESI RIGHT L5 #1 performed by Kenneth Smith MD at [facility] OR    SHOULDER SURGERY Right 2024    lump removals       Current Outpatient Medications   Medication Sig Dispense Refill    Cholecalciferol (VITAMIN D3) 1.25 MG (00230 UT) CAPS Take 1 capsule by mouth Once a week at 5 PM      thyroid (ARMOUR) 30 MG tablet Take 1 tablet by mouth daily      thyroid (ARMOUR) 15 MG tablet Take 1 tablet by mouth daily      traMADol (ULTRAM) 50 MG tablet Take 1 tablet by mouth every 8 hours as needed for Pain. Max Daily Amount: 150 mg      meloxicam (MOBIC) 15 MG tablet Take 1 tablet by mouth daily as needed for Pain 90 tablet 0    DULoxetine (CYMBALTA) 30 MG extended release capsule

## 2024-10-16 NOTE — PROGRESS NOTES
NPO after midnight  Bring insurance info and drivers license  Wear comfortable clean clothing  Do not bring jewelry  Shower night before and morning of surgery with a liquid antibacterial soap  Bring list of medications with dosage and how often taken  Follow all instructions given by your physician   needed at discharge  You must have a responsible adult with you day of surgery and for 24 hours after surgery  Call [facility] [phone] for any questionsSurgical Site Infections”      How can we work together to prevent Surgical Site Infections?   We would like to thank you for choosing [facility] for your Surgical Care.  Below you will find helpful information on how we can work together to prevent Surgical Site Infections.    What is a Surgical Site Infection (SSI)?  A surgical site infection is an infection that occurs after surgery in the part of the body where the surgery took place. Most patients who have surgery do not develop an infection. However, infections develop in about 1 to 3 out of every 100 patients who have surgery.  Some of the common symptoms of a surgical site infection are:  Redness and pain around the area where you had surgery  Drainage of cloudy fluid from your surgical wound  Fever    Can SSIs be treated?  Yes. Most surgical site infections can be treated with antibiotics. The antibiotic given to you depends on the bacteria (germs) causing the infection. Sometimes patients with SSIs also need another surgery to treat the infection.    What are some of the things that hospitals are doing to prevent SSIs?  To prevent SSIs, doctors, nurses, and other healthcare providers:  May remove some of your hair immediately before your surgery using electric clippers if the hair is in the same area where the procedure will occur. They should not shave you with a razor.  Give you antibiotics before your surgery starts. In most cases, you should get antibiotics within 60 minutes before the

## 2024-10-22 NOTE — H&P
Date Taking? Authorizing Provider   Cholecalciferol (VITAMIN D3) 1.25 MG (10023 UT) CAPS Take 1 capsule by mouth Once a week at 5 PM    Rolanda Trotter MD   thyroid (ARMOUR) 30 MG tablet Take 1 tablet by mouth daily    Rolanda Trotter MD   thyroid (ARMOUR) 15 MG tablet Take 1 tablet by mouth daily    Rolanda Trotter MD   traMADol (ULTRAM) 50 MG tablet Take 1 tablet by mouth every 8 hours as needed for Pain. Max Daily Amount: 150 mg    Rolanda Trotter MD   meloxicam (MOBIC) 15 MG tablet Take 1 tablet by mouth daily as needed for Pain 6/6/24 10/14/24  Obdulia Haskins, APRN - CNP   DULoxetine (CYMBALTA) 30 MG extended release capsule Take 1 capsule by mouth 2 times daily 12/1/23   Rolanda Trotter MD   propranolol (INDERAL) 10 MG tablet Take 1 tablet by mouth 2 times daily 12/1/23   Rolanda Trotter MD   rosuvastatin (CRESTOR) 5 MG tablet Take 1 tablet by mouth daily    Rolanda Trotter MD   rOPINIRole (REQUIP) 1 MG tablet Take 1 tablet by mouth nightly    Rolanda Trotter MD    Scheduled Meds:  Continuous Infusions:  PRN Meds:.      Allergies:  Iodides, Iodine, and Povidone iodine    Social History:   Social History     Socioeconomic History    Marital status:      Spouse name: None    Number of children: None    Years of education: None    Highest education level: None   Tobacco Use    Smoking status: Every Day     Current packs/day: 1.00     Average packs/day: 1 pack/day for 36.8 years (36.8 ttl pk-yrs)     Types: Cigarettes     Start date: 1988    Smokeless tobacco: Never   Vaping Use    Vaping status: Never Used   Substance and Sexual Activity    Alcohol use: Never    Drug use: Never     Social Determinants of Health     Financial Resource Strain: Medium Risk (10/14/2024)    Overall Financial Resource Strain (CARDIA)     Difficulty of Paying Living Expenses: Somewhat hard   Food Insecurity: No Food Insecurity (10/14/2024)    Hunger Vital Sign     Worried About

## 2024-10-23 ENCOUNTER — ANESTHESIA (OUTPATIENT)
Dept: OPERATING ROOM | Age: 69
End: 2024-10-23
Payer: MEDICARE

## 2024-10-23 ENCOUNTER — HOSPITAL ENCOUNTER (OUTPATIENT)
Age: 69
Setting detail: OUTPATIENT SURGERY
Discharge: HOME | End: 2024-10-23
Attending: ORTHOPAEDIC SURGERY | Admitting: ORTHOPAEDIC SURGERY
Payer: MEDICARE

## 2024-10-23 ENCOUNTER — ANESTHESIA EVENT (OUTPATIENT)
Dept: OPERATING ROOM | Age: 69
End: 2024-10-23
Payer: MEDICARE

## 2024-10-23 VITALS
HEART RATE: 77 BPM | BODY MASS INDEX: 22.3 KG/M2 | WEIGHT: 130.6 LBS | HEIGHT: 64 IN | RESPIRATION RATE: 16 BRPM | OXYGEN SATURATION: 97 % | DIASTOLIC BLOOD PRESSURE: 84 MMHG | SYSTOLIC BLOOD PRESSURE: 122 MMHG | TEMPERATURE: 98.1 F

## 2024-10-23 DIAGNOSIS — M75.121 COMPLETE TEAR OF RIGHT ROTATOR CUFF, UNSPECIFIED WHETHER TRAUMATIC: Primary | ICD-10-CM

## 2024-10-23 PROCEDURE — 7100000011 HC PHASE II RECOVERY - ADDTL 15 MIN: Performed by: ORTHOPAEDIC SURGERY

## 2024-10-23 PROCEDURE — 3600000014 HC SURGERY LEVEL 4 ADDTL 15MIN: Performed by: ORTHOPAEDIC SURGERY

## 2024-10-23 PROCEDURE — 6360000002 HC RX W HCPCS: Performed by: NURSE ANESTHETIST, CERTIFIED REGISTERED

## 2024-10-23 PROCEDURE — 2709999900 HC NON-CHARGEABLE SUPPLY: Performed by: ORTHOPAEDIC SURGERY

## 2024-10-23 PROCEDURE — 7100000000 HC PACU RECOVERY - FIRST 15 MIN: Performed by: ORTHOPAEDIC SURGERY

## 2024-10-23 PROCEDURE — 2580000003 HC RX 258: Performed by: PHYSICIAN ASSISTANT

## 2024-10-23 PROCEDURE — 3700000000 HC ANESTHESIA ATTENDED CARE: Performed by: ORTHOPAEDIC SURGERY

## 2024-10-23 PROCEDURE — 6360000002 HC RX W HCPCS: Performed by: PHYSICIAN ASSISTANT

## 2024-10-23 PROCEDURE — 3600000004 HC SURGERY LEVEL 4 BASE: Performed by: ORTHOPAEDIC SURGERY

## 2024-10-23 PROCEDURE — 7100000001 HC PACU RECOVERY - ADDTL 15 MIN: Performed by: ORTHOPAEDIC SURGERY

## 2024-10-23 PROCEDURE — 7100000010 HC PHASE II RECOVERY - FIRST 15 MIN: Performed by: ORTHOPAEDIC SURGERY

## 2024-10-23 PROCEDURE — 64415 NJX AA&/STRD BRCH PLXS IMG: CPT | Performed by: ANESTHESIOLOGY

## 2024-10-23 PROCEDURE — 2500000003 HC RX 250 WO HCPCS: Performed by: NURSE ANESTHETIST, CERTIFIED REGISTERED

## 2024-10-23 PROCEDURE — 6360000002 HC RX W HCPCS: Performed by: ANESTHESIOLOGY

## 2024-10-23 PROCEDURE — C1713 ANCHOR/SCREW BN/BN,TIS/BN: HCPCS | Performed by: ORTHOPAEDIC SURGERY

## 2024-10-23 PROCEDURE — 2500000003 HC RX 250 WO HCPCS: Performed by: ANESTHESIOLOGY

## 2024-10-23 PROCEDURE — 3700000001 HC ADD 15 MINUTES (ANESTHESIA): Performed by: ORTHOPAEDIC SURGERY

## 2024-10-23 DEVICE — ALPHAVENT SUTURE ANCHOR 5.5MM PEEK, SUTURE ANCHOR WITH 3 STRANDS #2 XBRAID S SUTURE WITH MO-6 TAPERED NEEDLES
Type: IMPLANTABLE DEVICE | Site: SHOULDER | Status: FUNCTIONAL
Brand: ALPHAVENT

## 2024-10-23 RX ORDER — ROCURONIUM BROMIDE 10 MG/ML
INJECTION, SOLUTION INTRAVENOUS
Status: DISCONTINUED | OUTPATIENT
Start: 2024-10-23 | End: 2024-10-23 | Stop reason: SDUPTHER

## 2024-10-23 RX ORDER — HYDROCODONE BITARTRATE AND ACETAMINOPHEN 5; 325 MG/1; MG/1
2 TABLET ORAL EVERY 4 HOURS PRN
Status: DISCONTINUED | OUTPATIENT
Start: 2024-10-23 | End: 2024-10-23 | Stop reason: HOSPADM

## 2024-10-23 RX ORDER — SODIUM CHLORIDE 0.9 % (FLUSH) 0.9 %
5-40 SYRINGE (ML) INJECTION PRN
Status: DISCONTINUED | OUTPATIENT
Start: 2024-10-23 | End: 2024-10-23 | Stop reason: HOSPADM

## 2024-10-23 RX ORDER — OXYCODONE HYDROCHLORIDE 5 MG/1
5-10 TABLET ORAL EVERY 6 HOURS PRN
Qty: 40 TABLET | Refills: 0 | Status: SHIPPED | OUTPATIENT
Start: 2024-10-23 | End: 2024-10-30

## 2024-10-23 RX ORDER — LIDOCAINE HYDROCHLORIDE 20 MG/ML
INJECTION, SOLUTION INTRAVENOUS
Status: DISCONTINUED | OUTPATIENT
Start: 2024-10-23 | End: 2024-10-23 | Stop reason: SDUPTHER

## 2024-10-23 RX ORDER — DEXAMETHASONE SODIUM PHOSPHATE 10 MG/ML
INJECTION, EMULSION INTRAMUSCULAR; INTRAVENOUS
Status: DISCONTINUED | OUTPATIENT
Start: 2024-10-23 | End: 2024-10-23 | Stop reason: SDUPTHER

## 2024-10-23 RX ORDER — ONDANSETRON 2 MG/ML
INJECTION INTRAMUSCULAR; INTRAVENOUS
Status: DISCONTINUED | OUTPATIENT
Start: 2024-10-23 | End: 2024-10-23 | Stop reason: SDUPTHER

## 2024-10-23 RX ORDER — GLYCOPYRROLATE 1 MG/5 ML
SYRINGE (ML) INTRAVENOUS
Status: DISCONTINUED | OUTPATIENT
Start: 2024-10-23 | End: 2024-10-23 | Stop reason: SDUPTHER

## 2024-10-23 RX ORDER — DEXAMETHASONE SODIUM PHOSPHATE 4 MG/ML
INJECTION, SOLUTION INTRA-ARTICULAR; INTRALESIONAL; INTRAMUSCULAR; INTRAVENOUS; SOFT TISSUE
Status: COMPLETED | OUTPATIENT
Start: 2024-10-23 | End: 2024-10-23

## 2024-10-23 RX ORDER — PROPOFOL 10 MG/ML
INJECTION, EMULSION INTRAVENOUS
Status: DISCONTINUED | OUTPATIENT
Start: 2024-10-23 | End: 2024-10-23 | Stop reason: SDUPTHER

## 2024-10-23 RX ORDER — EPHEDRINE SULFATE/0.9% NACL/PF 25 MG/5 ML
SYRINGE (ML) INTRAVENOUS
Status: DISCONTINUED | OUTPATIENT
Start: 2024-10-23 | End: 2024-10-23 | Stop reason: SDUPTHER

## 2024-10-23 RX ORDER — CYCLOBENZAPRINE HCL 10 MG
10 TABLET ORAL 3 TIMES DAILY PRN
Status: DISCONTINUED | OUTPATIENT
Start: 2024-10-23 | End: 2024-10-23 | Stop reason: HOSPADM

## 2024-10-23 RX ORDER — SODIUM CHLORIDE 9 MG/ML
INJECTION, SOLUTION INTRAVENOUS PRN
Status: DISCONTINUED | OUTPATIENT
Start: 2024-10-23 | End: 2024-10-23 | Stop reason: HOSPADM

## 2024-10-23 RX ORDER — MORPHINE SULFATE 2 MG/ML
2 INJECTION, SOLUTION INTRAMUSCULAR; INTRAVENOUS
Status: DISCONTINUED | OUTPATIENT
Start: 2024-10-23 | End: 2024-10-23 | Stop reason: HOSPADM

## 2024-10-23 RX ORDER — CYCLOBENZAPRINE HCL 10 MG
10 TABLET ORAL 3 TIMES DAILY PRN
Qty: 30 TABLET | Refills: 0 | Status: SHIPPED | OUTPATIENT
Start: 2024-10-23 | End: 2024-11-02

## 2024-10-23 RX ORDER — DEXMEDETOMIDINE HYDROCHLORIDE 100 UG/ML
INJECTION, SOLUTION INTRAVENOUS
Status: COMPLETED | OUTPATIENT
Start: 2024-10-23 | End: 2024-10-23

## 2024-10-23 RX ORDER — ONDANSETRON 2 MG/ML
4 INJECTION INTRAMUSCULAR; INTRAVENOUS EVERY 6 HOURS PRN
Status: DISCONTINUED | OUTPATIENT
Start: 2024-10-23 | End: 2024-10-23 | Stop reason: HOSPADM

## 2024-10-23 RX ORDER — CEPHALEXIN 500 MG/1
500 CAPSULE ORAL 2 TIMES DAILY
Qty: 14 CAPSULE | Refills: 0 | Status: SHIPPED | OUTPATIENT
Start: 2024-10-23 | End: 2024-10-30

## 2024-10-23 RX ORDER — BUPIVACAINE HYDROCHLORIDE 5 MG/ML
INJECTION, SOLUTION EPIDURAL; INTRACAUDAL
Status: COMPLETED | OUTPATIENT
Start: 2024-10-23 | End: 2024-10-23

## 2024-10-23 RX ORDER — ACETAMINOPHEN 500 MG
1000 TABLET ORAL EVERY 8 HOURS
Qty: 84 TABLET | Refills: 1 | Status: SHIPPED | OUTPATIENT
Start: 2024-10-23

## 2024-10-23 RX ORDER — HYDROCODONE BITARTRATE AND ACETAMINOPHEN 5; 325 MG/1; MG/1
1 TABLET ORAL EVERY 4 HOURS PRN
Status: DISCONTINUED | OUTPATIENT
Start: 2024-10-23 | End: 2024-10-23 | Stop reason: HOSPADM

## 2024-10-23 RX ORDER — FENTANYL CITRATE 50 UG/ML
INJECTION, SOLUTION INTRAMUSCULAR; INTRAVENOUS
Status: DISCONTINUED | OUTPATIENT
Start: 2024-10-23 | End: 2024-10-23 | Stop reason: SDUPTHER

## 2024-10-23 RX ORDER — SODIUM CHLORIDE 0.9 % (FLUSH) 0.9 %
5-40 SYRINGE (ML) INJECTION EVERY 12 HOURS SCHEDULED
Status: DISCONTINUED | OUTPATIENT
Start: 2024-10-23 | End: 2024-10-23 | Stop reason: HOSPADM

## 2024-10-23 RX ORDER — NALOXONE HYDROCHLORIDE 0.4 MG/ML
INJECTION, SOLUTION INTRAMUSCULAR; INTRAVENOUS; SUBCUTANEOUS PRN
Status: DISCONTINUED | OUTPATIENT
Start: 2024-10-23 | End: 2024-10-23 | Stop reason: HOSPADM

## 2024-10-23 RX ORDER — MIDAZOLAM HYDROCHLORIDE 1 MG/ML
INJECTION, SOLUTION INTRAMUSCULAR; INTRAVENOUS
Status: DISCONTINUED | OUTPATIENT
Start: 2024-10-23 | End: 2024-10-23 | Stop reason: SDUPTHER

## 2024-10-23 RX ADMIN — MIDAZOLAM 2 MG: 1 INJECTION INTRAMUSCULAR; INTRAVENOUS at 07:37

## 2024-10-23 RX ADMIN — WATER 2000 MG: 1 INJECTION INTRAMUSCULAR; INTRAVENOUS; SUBCUTANEOUS at 07:54

## 2024-10-23 RX ADMIN — EPHEDRINE SULFATE 12.5 MG: 5 INJECTION INTRAVENOUS at 08:55

## 2024-10-23 RX ADMIN — EPHEDRINE SULFATE 12.5 MG: 5 INJECTION INTRAVENOUS at 09:02

## 2024-10-23 RX ADMIN — PROPOFOL 200 MG: 10 INJECTION, EMULSION INTRAVENOUS at 07:50

## 2024-10-23 RX ADMIN — ROCURONIUM BROMIDE 50 MG: 10 INJECTION INTRAVENOUS at 08:45

## 2024-10-23 RX ADMIN — SUGAMMADEX 200 MG: 100 INJECTION, SOLUTION INTRAVENOUS at 09:25

## 2024-10-23 RX ADMIN — BUPIVACAINE HYDROCHLORIDE 25 ML: 5 INJECTION, SOLUTION EPIDURAL; INTRACAUDAL; PERINEURAL at 07:40

## 2024-10-23 RX ADMIN — LIDOCAINE HYDROCHLORIDE 50 MG: 20 INJECTION, SOLUTION INTRAVENOUS at 07:50

## 2024-10-23 RX ADMIN — EPHEDRINE SULFATE 12.5 MG: 5 INJECTION INTRAVENOUS at 08:40

## 2024-10-23 RX ADMIN — SODIUM CHLORIDE: 9 INJECTION, SOLUTION INTRAVENOUS at 06:08

## 2024-10-23 RX ADMIN — EPHEDRINE SULFATE 12.5 MG: 5 INJECTION INTRAVENOUS at 08:45

## 2024-10-23 RX ADMIN — DEXAMETHASONE SODIUM PHOSPHATE 8 MG: 10 INJECTION, EMULSION INTRAMUSCULAR; INTRAVENOUS at 09:18

## 2024-10-23 RX ADMIN — ROCURONIUM BROMIDE 50 MG: 10 INJECTION INTRAVENOUS at 07:50

## 2024-10-23 RX ADMIN — Medication 0.4 MG: at 09:00

## 2024-10-23 RX ADMIN — ONDANSETRON 4 MG: 2 INJECTION INTRAMUSCULAR; INTRAVENOUS at 09:18

## 2024-10-23 RX ADMIN — DEXMEDETOMIDINE HCL 0.3 ML: 100 INJECTION INTRAVENOUS at 07:40

## 2024-10-23 RX ADMIN — FENTANYL CITRATE 100 MCG: 50 INJECTION, SOLUTION INTRAMUSCULAR; INTRAVENOUS at 07:37

## 2024-10-23 RX ADMIN — DEXAMETHASONE SODIUM PHOSPHATE 4 MG: 4 INJECTION, SOLUTION INTRAMUSCULAR; INTRAVENOUS at 07:40

## 2024-10-23 ASSESSMENT — PAIN - FUNCTIONAL ASSESSMENT
PAIN_FUNCTIONAL_ASSESSMENT: NONE - DENIES PAIN
PAIN_FUNCTIONAL_ASSESSMENT: 0-10

## 2024-10-23 NOTE — ANESTHESIA PROCEDURE NOTES
Peripheral Block    Patient location during procedure: OR  Reason for block: post-op pain management and at surgeon's request  Start time: 10/23/2024 7:40 AM  End time: 10/23/2024 7:45 AM  Staffing  Performed: anesthesiologist   Anesthesiologist: Anshu Sigala DO  Performed by: Anshu Sigala DO  Authorized by: Anshu Sigala DO    Preanesthetic Checklist  Completed: patient identified, IV checked, site marked, risks and benefits discussed, surgical/procedural consents, equipment checked, pre-op evaluation, timeout performed, anesthesia consent given, oxygen available, monitors applied/VS acknowledged, fire risk safety assessment completed and verbalized and blood product R/B/A discussed and consented  Peripheral Block   Patient position: supine  Prep: ChloraPrep  Provider prep: sterile gloves and mask  Patient monitoring: continuous pulse ox, IV access, responsive to questions and frequent blood pressure checks  Block type: Brachial plexus  Interscalene  Laterality: right  Injection technique: single-shot  Guidance: ultrasound guided    Needle   Needle type: short-bevel   Needle gauge: 20 G  Needle localization: ultrasound guidance  Needle length: 10 cm  Assessment   Injection assessment: negative aspiration for heme, local visualized surrounding nerve on ultrasound and no paresthesia on injection  Paresthesia pain: immediately resolved  Slow fractionated injection: yes  Hemodynamics: stable  Outcomes: uncomplicated and patient tolerated procedure well    Medications Administered  BUPivacaine (MARCAINE) PF injection 0.5% - Perineural   25 mL - 10/23/2024 7:40:00 AM  dexmedeTOMIDine (PRECEDEX) injection 200 mcg/2 mL - Perineural   0.3 mL - 10/23/2024 7:40:00 AM  dexAMETHasone (DECADRON) injection 4 mg/mL - Perineural   4 mg - 10/23/2024 7:40:00 AM

## 2024-10-23 NOTE — BRIEF OP NOTE
Brief Postoperative Note      Patient: Graciela Fregoso  YOB: 1955  MRN: [id number]    Date of Procedure: 10/23/2024    Pre-Op Diagnosis Codes:      * Nontraumatic tear of right rotator cuff, unspecified tear extent [M75.101]     * Impingement syndrome of right shoulder [M75.41]    Post-Op Diagnosis: Same       Procedure(s):  Right Shoulder Arthroscopy with Debridement,Rotator Cuff Repair, Open Akil Acrimoplasty and Bicep Tenodesis    Surgeon(s):  Kenneth Narvaez MD    Assistant:  Physician Assistant: Zohaib Greenfield PA    Anesthesia: General    Estimated Blood Loss (mL): less than 50     Complications: None    Specimens:   * No specimens in log *    Implants:  Implant Name Type Inv. Item Serial No.  Lot No. LRB No. Used Action   ANCHOR SUTURE JACKELIN 5.5 MM SZ 2 PEEK 3 STRND FORC FIBER - [id number]  ANCHOR SUTURE JACKELIN 5.5 MM SZ 2 PEEK 3 STRND FORC FIBER  PAUL KAVITHA-WD [id number] Right 1 Implanted         Drains: * No LDAs found *    Findings:  Infection Present At Time Of Surgery (PATOS) (choose all levels that have infection present):  No infection present  Other Findings: none    Electronically signed by Kenneth Narvaez MD on 10/23/2024 at 9:27 AM

## 2024-10-23 NOTE — OP NOTE
[address] [address]                            OPERATIVE REPORT      PATIENT NAME: NATALIA WADE            : 1955  MED REC NO: [id number]                       ROOM: [facility] OR                                               (General) POOL                                               RM    ACCOUNT NO: [id number]                       ADMIT DATE: 10/23/2024  PROVIDER: Kenneth Narvaez MD      DATE OF PROCEDURE:  10/23/2024    SURGEON:  Kenneth Narvaez MD    PRINCIPAL DIAGNOSIS:  Right rotator cuff tear with subluxated bicipital tendon and acromioclavicular hypertrophy.    POSTOPERATIVE DIAGNOSIS:  Right rotator cuff tear with subluxated bicipital tendon and acromioclavicular hypertrophy.    PROCEDURES PERFORMED:  Open Akil procedure, open anterior acromioplasty, arthroscopic evaluation of shoulder, removal of bicipital stump, right proximal biceps tenodesis, and repair of 1-cm tear of right rotator cuff.    FIRST ASSISTANT:  Zohaib Greenfield PA-C.    COMPLICATIONS:  None.    ANESTHESIA:  General plus block.    CONDITION TO RECOVERY:  Good.    SPONGE:  Correct.    ANTIBIOTICS:  The patient was given 2 g of Ancef prior to surgery.    ESTIMATED BLOOD LOSS:  Less than 50 mL.    INDICATIONS FOR PROCEDURE:  The patient is a 69-year-old white female with a known history of right shoulder pain.  Conservative treatments failed including injections, anti-inflammatories, and exercise program.  She had an MRI, which showed a lipoma posteriorly because we did not want to mix the lipoma with possibly if it was liposarcoma, I recommended biopsy and removing this lipoma, we did and that wound is healed.  She subsequently ended up with just a lipoma.  It was just a benign lesion, so I figured now at this time to proceed with a right rotator cuff tear, which was shown via MRI and also showed a perched biceps tendon in the groove.  Therefore, I

## 2024-10-23 NOTE — PROGRESS NOTES
0936: pt arrives to pacu. Pt on room air. Pt responds to verbal stimulation. VSS. Respirations unlabored. RUE in sling. Ice placed on RUE. Pt able to wiggle fingers slightly. Pt denies pain  0945: pt resting in bed with eyes closed  0948: pt sitting up eating ice chips   0955: pt denies pain   1006: pt meets discharge criteria from pacu. Pt transported to [facility]

## 2024-10-23 NOTE — PROGRESS NOTES
Pt admitted to [facility] room 5 and oriented to unit. SCD sleeves applied. Nares swabbed. Pt verbalized permission for first name, last initial and physicians name on white board. SDS board and discharge criteria explained, pt and family verbalized understanding. Pt denies thoughts of harming self or others. Call light in reach. Family at the bedside.  Breanne [phone]

## 2024-10-23 NOTE — ANESTHESIA POSTPROCEDURE EVALUATION
Department of Anesthesiology  Postprocedure Note    Patient: Graciela Fregoso  MRN: [id number]  YOB: 1955  Date of evaluation: 10/23/2024    Procedure Summary       Date: 10/23/24 Room / Location: [facility]  [facility]    Anesthesia Start: 0747 Anesthesia Stop: 0940    Procedure: Right Shoulder Arthroscopy with Debridement,Rotator Cuff Repair, Open Akil Acrimoplasty and Bicep Tenodesis (Shoulder) Diagnosis:       Nontraumatic tear of right rotator cuff, unspecified tear extent      Impingement syndrome of right shoulder      (Nontraumatic tear of right rotator cuff, unspecified tear extent [M75.101])      (Impingement syndrome of right shoulder [M75.41])    Surgeons: Kenneth Narvaez MD Responsible Provider: Anshu Sigala DO    Anesthesia Type: general, regional ASA Status: 2            Anesthesia Type: No value filed.    Leigh Phase I: Leigh Score: 9    Leigh Phase II:      Anesthesia Post Evaluation    Patient location during evaluation: bedside  Patient participation: complete - patient participated  Level of consciousness: awake  Airway patency: patent  Nausea & Vomiting: no vomiting and no nausea  Cardiovascular status: hemodynamically stable  Respiratory status: acceptable  Hydration status: stable  Pain management: adequate    No notable events documented.

## 2024-10-23 NOTE — PROGRESS NOTES
Pt returned to [facility] room 5. Vitals and assessment as charted. 0.9 infusing, @400ml to count from PACU. Pt has snack and drink. Family at the bedside. Pt and family verbalized understanding of discharge criteria and call light use. Call light in reach.

## 2024-10-23 NOTE — DISCHARGE INSTRUCTIONS
INPATIENT DISCHARGE INSTRUCTIONS  Dr. Kenneth Narvaez MD    ACTIVITY / WEIGHTBEARING  INSTRUCTIONS:  Non-weightbearing to the right upper extremity  Passive Range of Motion only to the right shoulder and elbow  Physical therapy as directed, 3 times per week starting 10/25/2024  Sling to right upper extremity for 6 weeks       WOUND/DRESSING INSTRUCTIONS:  Always ensure you wash your hands before and after caring for your wound/dressing.  Keep your dressing clean and dry for first 2 weeks. Change dressing as needed.  Can wash around incision.  After 2 weeks, can get incision wet, but don't submerge.  Do not place antibiotic ointment, lotion, creams on surgical incision.  Smoking impairs adequate wound healing.  If smoking , consider quitting.      MEDICATION INSTRUCTIONS:  Take pain medication as prescribed.  Roxicodone 5mg, 1-2 tablet every 6 hours as needed for pain, decrease as tolerated  Take acetaminophen (Tylenol) as prescribed. 1000mg every 8 hours scheduled to help control pain with the roxicodone utilized for breakthrough pain control  Take antibiotic as prescribed.  Keflex 500mg, 1 capsule twice a day until gone  Take muscle relaxer as prescribed. Flexeril 10mg, 1 tablet every 8 hours as needed for muscle spasms.  See orders regarding resuming your home medications and any new medications.      FOLLOW-UP CARE:  If a follow-up appointment was not made for you at discharge, call [phone] to schedule an appointment for 2 weeks.      Call Dr Narvaez's office with any concerns.    If a medical emergency, please call 911 or go to your local emergency room.

## 2024-10-23 NOTE — ANESTHESIA PRE PROCEDURE
Department of Anesthesiology  Preprocedure Note       Name:  Graciela Fregoso   Age:  69 y.o.  :  1955                                          MRN:  [id number]         Date:  10/23/2024      Surgeon: Surgeon(s):  Kenneth Narvaez MD    Procedure: Procedure(s):  Right Shoulder Arthroscopy with Debridement,Rotator Cuff Repair, Open Akil Acrimoplasty and Poss Bicep Tenodesis    Medications prior to admission:   Prior to Admission medications    Medication Sig Start Date End Date Taking? Authorizing Provider   cyclobenzaprine (FLEXERIL) 10 MG tablet Take 1 tablet by mouth 3 times daily as needed for Muscle spasms 10/23/24 11/2/24 Yes Zohaib Greenfield PA   cephALEXin (KEFLEX) 500 MG capsule Take 1 capsule by mouth 2 times daily for 7 days 10/23/24 10/30/24 Yes Zohaib Greenfield PA   oxyCODONE (ROXICODONE) 5 MG immediate release tablet Take 1-2 tablets by mouth every 6 hours as needed for Pain for up to 7 days. Intended supply: 7 days. Take lowest dose possible to manage pain Max Daily Amount: 40 mg 10/23/24 10/30/24 Yes Zohaib Greenfield PA   acetaminophen (TYLENOL) 500 MG tablet Take 2 tablets by mouth every 8 (eight) hours 10/23/24  Yes Zohaib Greenfield PA   Cholecalciferol (VITAMIN D3) 1.25 MG (35816 UT) CAPS Take 1 capsule by mouth Once a week at 5 PM   Yes Provider, MD Rolanda   thyroid (ARMOUR) 30 MG tablet Take 1 tablet by mouth daily   Yes Provider, MD Rolanda   thyroid (ARMOUR) 15 MG tablet Take 1 tablet by mouth daily   Yes Provider, Historical, MD   traMADol (ULTRAM) 50 MG tablet Take 1 tablet by mouth every 8 hours as needed for Pain.   Yes ProviderRolanda MD   DULoxetine (CYMBALTA) 30 MG extended release capsule Take 1 capsule by mouth 2 times daily 23  Yes ProviderRolanda MD   propranolol (INDERAL) 10 MG tablet Take 1 tablet by mouth 2 times daily 23  Yes ProviderRolanda MD   rosuvastatin (CRESTOR) 5 MG tablet Take 1 tablet by mouth daily   Yes Provider

## 2025-06-09 ENCOUNTER — OFFICE VISIT (OUTPATIENT)
Dept: INTERNAL MEDICINE CLINIC | Age: 70
End: 2025-06-09

## 2025-06-09 VITALS
SYSTOLIC BLOOD PRESSURE: 138 MMHG | HEART RATE: 83 BPM | OXYGEN SATURATION: 98 % | BODY MASS INDEX: 22.77 KG/M2 | WEIGHT: 133.4 LBS | DIASTOLIC BLOOD PRESSURE: 84 MMHG | HEIGHT: 64 IN

## 2025-06-09 DIAGNOSIS — M25.562 LEFT KNEE PAIN, UNSPECIFIED CHRONICITY: ICD-10-CM

## 2025-06-09 DIAGNOSIS — Z01.818 PREOP EXAMINATION: Primary | ICD-10-CM

## 2025-06-09 RX ORDER — DENOSUMAB 60 MG/ML
60 INJECTION SUBCUTANEOUS ONCE
COMMUNITY
Start: 2025-04-10

## 2025-06-09 SDOH — ECONOMIC STABILITY: FOOD INSECURITY: WITHIN THE PAST 12 MONTHS, THE FOOD YOU BOUGHT JUST DIDN'T LAST AND YOU DIDN'T HAVE MONEY TO GET MORE.: NEVER TRUE

## 2025-06-09 SDOH — ECONOMIC STABILITY: FOOD INSECURITY: WITHIN THE PAST 12 MONTHS, YOU WORRIED THAT YOUR FOOD WOULD RUN OUT BEFORE YOU GOT MONEY TO BUY MORE.: NEVER TRUE

## 2025-06-09 ASSESSMENT — PATIENT HEALTH QUESTIONNAIRE - PHQ9
2. FEELING DOWN, DEPRESSED OR HOPELESS: NOT AT ALL
SUM OF ALL RESPONSES TO PHQ QUESTIONS 1-9: 0
1. LITTLE INTEREST OR PLEASURE IN DOING THINGS: NOT AT ALL
SUM OF ALL RESPONSES TO PHQ QUESTIONS 1-9: 0

## 2025-06-09 NOTE — PROGRESS NOTES
1955    Chief Complaint   Patient presents with    Pre-op Exam       Pt is a 69 y.o. female who presents for a preoperative medical consultation at the request of Dr. James prior to Left Knee Replacement.  Pt complains of left knee pain which is ongoing for a few months. Patient notes she \"tweaked something on the side\" of her knee and requires surgery.  Pain is improved with rest and tylenol.  Pain is worsened by excessive ambulation.  Pt has failed conservative measures such as steroid injections, Synvisc injections, physical therapy, therefore she wishes to proceed with surgical intervention.      No fever, chills, cough, SOB, CP, N/V/D, bowel/bladder abnormalities, numbness/tingling worse than baseline (right sided sciatica pain pattern at baseline), mood changes.    Current smoker 1ppd.     Reactions to anesthesia: none    History of excessive bleeding: none    History of blood clots: none    History of blood transfusions: none      Reactions to blood transfusion: none     Past Medical History:   Diagnosis Date    Fibromyalgia     Hyperlipidemia     Osteoporosis     Restless leg syndrome     Thyroid disease        Past Surgical History:   Procedure Laterality Date    APPENDECTOMY      BACK SURGERY  2018    plates and screws to lower back    FOOT SURGERY      foriegn object removal    HYSTERECTOMY (CERVIX STATUS UNKNOWN)      KNEE ARTHROSCOPY Bilateral     PAIN MANAGEMENT PROCEDURE Right 01/04/2021    TFESI RIGHT L5 #1 performed by Kenneth Smith MD at [facility] OR    SHOULDER ARTHROSCOPY N/A 10/23/2024    Right Shoulder Arthroscopy with Debridement,Rotator Cuff Repair, Open Akil Acrimoplasty and Bicep Tenodesis performed by Kenneth Narvaez MD at [facility] OR    SHOULDER SURGERY Right 2024    lump removals       Current Outpatient Medications   Medication Sig Dispense Refill    PROLIA 60 MG/ML SOSY SC injection Inject 1 mL into the skin once      Magnesium 250 MG CAPS Take 250 mg by mouth

## 2025-06-30 ENCOUNTER — HOSPITAL ENCOUNTER (OUTPATIENT)
Dept: INTERVENTIONAL RADIOLOGY/VASCULAR | Age: 70
Discharge: HOME | End: 2025-07-02
Attending: ORTHOPAEDIC SURGERY
Payer: MEDICARE

## 2025-06-30 DIAGNOSIS — M79.662 PAIN OF LEFT LOWER LEG: ICD-10-CM

## 2025-06-30 DIAGNOSIS — M79.89 SWELLING OF LIMB: ICD-10-CM

## 2025-06-30 PROCEDURE — 93971 EXTREMITY STUDY: CPT

## (undated) DEVICE — 3M™ IOBAN™ 2 ANTIMICROBIAL INCISE DRAPE 6650EZ: Brand: IOBAN™ 2

## (undated) DEVICE — LIQUIBAND RAPID ADHESIVE 36/CS 0.8ML: Brand: MEDLINE

## (undated) DEVICE — [THREADED CANNULA.  DO NOT RESTERILIZE,  DO NOT USE IF PACKAGE IS DAMAGED]: Brand: DRI-LOK

## (undated) DEVICE — NEEDLE FREE SUT MAYO 1/2 CIR TRCR SZ 4 ST

## (undated) DEVICE — SOLUTION IRRIG 3000ML 0.9% SOD CHL USP UROMATIC PLAS CONT

## (undated) DEVICE — YANKAUER,BULB TIP,W/O VENT,RIGID,STERILE: Brand: MEDLINE

## (undated) DEVICE — SUTURE VICRYL + SZ 2-0 L27IN ABSRB UD CP-1 1/2 CIR REV CUT VCP266H

## (undated) DEVICE — DRESSING,GAUZE,XEROFORM,CURAD,5"X9",ST: Brand: CURAD

## (undated) DEVICE — TAPE,ELASTIC,FOAM,CURAD,4"X5.5YD,LF: Brand: CURAD

## (undated) DEVICE — HYPODERMIC SAFETY NEEDLE: Brand: MAGELLAN

## (undated) DEVICE — PACK-MAJOR

## (undated) DEVICE — PAD,ABDOMINAL,5"X9",STERILE,LF,1/PK: Brand: MEDLINE INDUSTRIES, INC.

## (undated) DEVICE — SHOULDER: Brand: MEDLINE INDUSTRIES, INC.

## (undated) DEVICE — [ARTHROSCOPY PUMP,  DO NOT USE IF PACKAGE IS DAMAGED,  KEEP DRY,  KEEP AWAY FROM SUNLIGHT,  PROTECT FROM HEAT AND RADIOACTIVE SOURCES.]: Brand: FLOSTEADY

## (undated) DEVICE — TOWEL,OR,DSP,ST,BLUE,STD,4/PK,20PK/CS: Brand: MEDLINE

## (undated) DEVICE — LABEL MED DRUG CUST

## (undated) DEVICE — NEEDLE SPNL 22GA L3.5IN BLK HUB S STL REG WALL FIT STYL W/

## (undated) DEVICE — 450 ML BOTTLE OF 0.05% CHLORHEXIDINE GLUCONATE IN 99.95% STERILE WATER FOR IRRIGATION, USP AND APPLICATOR.: Brand: IRRISEPT ANTIMICROBIAL WOUND LAVAGE

## (undated) DEVICE — 5.5MM ALPHAVENT TAP: Brand: ALPHAVENT

## (undated) DEVICE — GLOVE ORANGE PI 8   MSG9080

## (undated) DEVICE — NEEDLE SYR 18GA L1.5IN RED PLAS HUB S STL BLNT FILL W/O

## (undated) DEVICE — Device

## (undated) DEVICE — SUTURE VIC + LEN CP1 0 70CM VCP267H

## (undated) DEVICE — SHEET,DRAPE,53X77,STERILE: Brand: MEDLINE

## (undated) DEVICE — HEWSON SUTURE RETRIEVER: Brand: HEWSON SUTURE RETRIEVER

## (undated) DEVICE — PENCIL SMK EVAC ALL IN 1 DSGN ENH VISIBILITY IMPROVED AIR

## (undated) DEVICE — PRECISION OFFSET (5.5 X 0.254 X 25.0MM)

## (undated) DEVICE — GAUZE,SPONGE,4"X4",12PLY,STERILE,LF,2'S: Brand: MEDLINE

## (undated) DEVICE — SLING ARM POST OPERATIVE LG GRY ULTRASLING

## (undated) DEVICE — SPONGE GZ W4XL4IN COT 12 PLY TYP VII WVN C FLD DSGN STERILE

## (undated) DEVICE — SUTURE FIBERWIRE SZ 2 W/ TAPERED NEEDLE BLUE L38IN NONABSORB BLU L26.5MM 1/2 CIRCLE AR7200

## (undated) DEVICE — SYRINGE MED 5ML STD CLR PLAS LUERLOCK TIP N CTRL DISP

## (undated) DEVICE — SLING ARM M 28-33CM BLK MESH FAB EZ OPN FR PNL

## (undated) DEVICE — TUBING, SUCTION, 1/4" X 20', STRAIGHT: Brand: MEDLINE INDUSTRIES, INC.

## (undated) DEVICE — [AGGRESSIVE PLUS CUTTER, ARTHROSCOPIC SHAVER BLADE,  DO NOT RESTERILIZE,  DO NOT USE IF PACKAGE IS DAMAGED,  KEEP DRY,  KEEP AWAY FROM SUNLIGHT]: Brand: FORMULA

## (undated) DEVICE — SYRINGE MED 3ML CLR PLAS STD N CTRL LUERLOCK TIP DISP

## (undated) DEVICE — GAUZE,SPONGE,8"X4",12PLY,XRAY,STRL,LF: Brand: MEDLINE

## (undated) DEVICE — GLOVE ORANGE PI 7 1/2   MSG9075